# Patient Record
Sex: MALE | Race: WHITE | NOT HISPANIC OR LATINO | Employment: OTHER | URBAN - NONMETROPOLITAN AREA
[De-identification: names, ages, dates, MRNs, and addresses within clinical notes are randomized per-mention and may not be internally consistent; named-entity substitution may affect disease eponyms.]

---

## 2020-01-22 ENCOUNTER — OFFICE VISIT (OUTPATIENT)
Dept: ORTHOPEDIC SURGERY | Facility: CLINIC | Age: 70
End: 2020-01-22

## 2020-01-22 VITALS — TEMPERATURE: 98.3 F | WEIGHT: 166.4 LBS | HEIGHT: 65 IN | BODY MASS INDEX: 27.72 KG/M2

## 2020-01-22 DIAGNOSIS — M17.11 PRIMARY OSTEOARTHRITIS OF RIGHT KNEE: Primary | ICD-10-CM

## 2020-01-22 PROCEDURE — 73560 X-RAY EXAM OF KNEE 1 OR 2: CPT | Performed by: PHYSICIAN ASSISTANT

## 2020-01-22 PROCEDURE — 99203 OFFICE O/P NEW LOW 30 MIN: CPT | Performed by: PHYSICIAN ASSISTANT

## 2020-01-22 RX ORDER — ALLOPURINOL 300 MG/1
300 TABLET ORAL DAILY
COMMUNITY
Start: 2019-11-21

## 2020-01-22 RX ORDER — IBUPROFEN 200 MG
200 TABLET ORAL EVERY 6 HOURS PRN
COMMUNITY
End: 2021-08-05

## 2020-01-22 RX ORDER — LOSARTAN POTASSIUM 100 MG/1
TABLET ORAL
COMMUNITY
End: 2021-08-05

## 2020-02-02 PROBLEM — M17.11 PRIMARY OSTEOARTHRITIS OF RIGHT KNEE: Status: ACTIVE | Noted: 2020-02-02

## 2020-02-02 RX ORDER — DICLOFENAC SODIUM 75 MG/1
75 TABLET, DELAYED RELEASE ORAL 2 TIMES DAILY
COMMUNITY
Start: 2019-11-18 | End: 2021-08-05

## 2020-02-02 NOTE — PROGRESS NOTES
NEW VISIT    Patient: Russell Hernandez  ?  YOB: 1950    MRN: [id number]  ?  Chief Complaint   Patient presents with   • Right Knee - Pain, Establish Care      ?  HPI:   69 year old male patient presents with complaint of right knee pain for approximaely 3 months with progressive worsening. He denies any injury to the knee. He has seen Dr. Franco in the recent months and was given a prescription for advil.   Pain Location: right knee  Radiation: into medial face of tibia  Quality: stabbing, burning  Intensity/Severity: moderate  Duration: approximately 3 months   Onset quality: gradual   Timing: intermittent  Aggravating Factors: walking, standing  Alleviating Factors: rest  Previous Episodes: none mentioned  Associated Symptoms: pain, swelling  ADLs Affected: ambulating, recreational activities/sports    This patient is a new patient.  This problem is new to this examiner.      Allergies: No Known Allergies    Medications:   Home Medications:  Current Outpatient Medications on File Prior to Visit   Medication Sig   • allopurinol (ZYLOPRIM) 300 MG tablet Take 300 mg by mouth Daily.   • ibuprofen (ADVIL,MOTRIN) 200 MG tablet Take 200 mg by mouth Every 6 (Six) Hours As Needed for Mild Pain .   • losartan (COZAAR) 100 MG tablet losartan 100 mg tablet   • diclofenac (VOLTAREN) 75 MG EC tablet Take 75 mg by mouth 2 (Two) Times a Day.     No current facility-administered medications on file prior to visit.      Current Medications:  Scheduled Meds:  PRN Meds:.    I have reviewed the patient's medical history in detail and updated the computerized patient record.  Review and summarization of old records include:    Past Medical History:   Diagnosis Date   • Agitation    • Alcoholism /alcohol abuse (CMS/HCC)    • Depression    • Gout    • HTN (hypertension)    • Poor concentration    • Sensitivity to light    • Sleep apnea    • Sleep disorder      Past Surgical History:   Procedure Laterality Date   • JOINT  "REPLACEMENT Left     Knee   • KNEE SURGERY       Social History     Occupational History   • Not on file   Tobacco Use   • Smoking status: Never Smoker   Substance and Sexual Activity   • Alcohol use: Yes     Alcohol/week: 6.0 standard drinks     Types: 6 Standard drinks or equivalent per week   • Drug use: Defer   • Sexual activity: Defer      Social History     Social History Narrative   • Not on file     Family History   Problem Relation Age of Onset   • Diabetes Other    • Heart disease Other    • Hypertension Other          Review of Systems  Constitutional: Negative.  Negative for fever.   Eyes: Negative.    Respiratory: Negative.    Cardiovascular: Negative.    Endocrine: Negative.    Musculoskeletal: Positive for arthralgias, gait problem and joint swelling.   Skin: Negative.  Negative for rash and wound.   Allergic/Immunologic: Negative.    Neurological: Negative for numbness.   Hematological: Negative.    Psychiatric/Behavioral: Negative.         Wt Readings from Last 3 Encounters:   02/17/20 75.3 kg (166 lb)   01/22/20 75.5 kg (166 lb 6.4 oz)     Ht Readings from Last 3 Encounters:   02/17/20 165 cm (64.96\")   01/22/20 165.1 cm (65\")     Body mass index is 27.69 kg/m².  Facility age limit for growth percentiles is 20 years.  Vitals:    01/22/20 1440   Temp: 98.3 °F (36.8 °C)         Physical Exam  Constitutional: Patient is oriented to person, place, and time. Appears well-developed and well-nourished.   HENT:   Head: Normocephalic and atraumatic.   Eyes: Conjunctivae and EOM are normal. Pupils are equal, round, and reactive to light.   Cardiovascular: Normal rate and intact distal pulses.   Pulmonary/Chest: Effort normal and breath sounds normal.   Musculoskeletal:   See detailed exam below   Neurological: Alert and oriented to person, place, and time. No sensory deficit. Coordination normal.   Skin: Skin is warm and dry. Capillary refill takes less than 2 seconds. No rash noted. No erythema. "   Psychiatric: Patient has a normal mood and affect. His behavior is normal. Judgment and thought content normal.   Nursing note and vitals reviewed.      Ortho Exam:   Affected Knee: Right knee  Patient has crepitus throughout range of motion.   Mild effusion.   Moderate joint line tenderness is noted on the medial aspect of the knee.   Patient has a varus orientation of the knee.   There is mild fullness and tenderness in the popliteal fossa.   Range of motion: Flexion 0- 110 degrees.   Neurovascular status is intact.  Dorsalis pedis and posterior tibial artery pulses are palpable. Common peroneal nerve function is well preserved.   Patient's gait is cautious and antalgic.   Special Testing:  Lachman negative,   Anterior drawer negative,   Posterior drawer negative,   Lee's negative,   Patellofemoral grind positive.        Diagnostics:  Independently reviewed and interpreted plain film xrays of Right Knee, performed at [facility] on 11/18/2019, summary of my impression below:  AP, Lateral views  Findings: tricompartmental osteoarthritis with narrowing of the medial joint space, with a near bone on bone appearance, and narrowing of patellofemoral compartment. There are mild changes of the lateral joint space.  Bony lesion: no  Soft tissues: within normal limits  Joint spaces: decreased  Hardware appropriately positioned: not applicable  Prior studies available for comparison: no   This patient's x-ray report was graded according to the Kellgren and Jhonny classification.  This took into account the joint space narrowing, osteophyte formation, sclerosis of the distal femur/proximal tibia along with deformity of those bones.  The findings were indicative of K L grade 3-4.           Assessment:  Russell was seen today for pain and establish care.    Diagnoses and all orders for this visit:    Primary osteoarthritis of right knee        Plan    · Discussed xray findings and treatment options available to  him.  · Steroid and Monovisc injection discussed-patient would like to get the monovisc if his insurance will approve it.    · Use of neoprene sleeve brace, hinge knee brace discussed and recommended   · Medication options discussed and recommended  · Surgical options discussed right total knee  · Rest, ice, compression, and elevation (RICE) therapy  · Stretching and strengthening exercises  · Alternate OTC Ibuprofen and Tylenol as needed/if clinically indicated    Date of encounter: 01/22/2020   Reid Hicks PA-C    Electronically signed by Reid Hicks PA-C, 02/02/20, 11:47 AM.

## 2020-02-17 ENCOUNTER — CLINICAL SUPPORT (OUTPATIENT)
Dept: ORTHOPEDIC SURGERY | Facility: CLINIC | Age: 70
End: 2020-02-17

## 2020-02-17 VITALS — WEIGHT: 166 LBS | BODY MASS INDEX: 27.66 KG/M2 | TEMPERATURE: 98.2 F | HEIGHT: 65 IN

## 2020-02-17 DIAGNOSIS — M17.11 OSTEOARTHRITIS OF RIGHT KNEE, UNSPECIFIED OSTEOARTHRITIS TYPE: Primary | ICD-10-CM

## 2020-02-17 PROCEDURE — 20610 DRAIN/INJ JOINT/BURSA W/O US: CPT | Performed by: PHYSICIAN ASSISTANT

## 2020-02-17 RX ORDER — UBIDECARENONE 75 MG
CAPSULE ORAL
COMMUNITY

## 2020-02-17 RX ORDER — CHOLECALCIFEROL (VITAMIN D3) 10(400)/ML
DROPS ORAL
COMMUNITY
End: 2021-08-05

## 2020-02-17 RX ORDER — BENZONATATE 100 MG/1
CAPSULE ORAL
COMMUNITY
Start: 2019-11-18 | End: 2021-08-05

## 2020-02-17 RX ORDER — AMOXICILLIN AND CLAVULANATE POTASSIUM 875; 125 MG/1; MG/1
1 TABLET, FILM COATED ORAL EVERY 12 HOURS
COMMUNITY
Start: 2019-11-18 | End: 2021-08-05

## 2020-02-17 RX ORDER — LIDOCAINE HYDROCHLORIDE 10 MG/ML
2 INJECTION, SOLUTION EPIDURAL; INFILTRATION; INTRACAUDAL; PERINEURAL
Status: COMPLETED | OUTPATIENT
Start: 2020-02-17 | End: 2020-02-17

## 2020-02-17 RX ADMIN — LIDOCAINE HYDROCHLORIDE 2 ML: 10 INJECTION, SOLUTION EPIDURAL; INFILTRATION; INTRACAUDAL; PERINEURAL at 14:43

## 2020-02-17 NOTE — PROGRESS NOTES
Procedure   Large Joint Arthrocentesis: R knee  Date/Time: 2/17/2020 2:43 PM  Consent given by: patient  Site marked: site marked  Timeout: Immediately prior to procedure a time out was called to verify the correct patient, procedure, equipment, support staff and site/side marked as required   Supporting Documentation  Indications: pain and joint swelling   Procedure Details  Location: knee - R knee  Preparation: Patient was prepped and draped in the usual sterile fashion  Needle size: 25 G  Approach: anterolateral  Medications administered: 88 mg Hyaluronan 88 MG/4ML; 2 mL lidocaine PF 1% 1 %  Patient tolerance: patient tolerated the procedure well with no immediate complications      Electronically signed by Reid Hicks PA-C, 02/17/20, 2:57 PM.

## 2020-02-17 NOTE — PROGRESS NOTES
INJECTION      Patient: Russell Hernandez    MRN: [id number]    YOB: 1950    Chief Complaint   Patient presents with   • Right Knee - Follow-up, Pain         History of Present Illness:  Russell Hernandez is here today for injection therapy. He is receiving his first injection of Monovisc into the right knee. No prior injections have been given. Options have been discussed and he understands.       Physical Exam:   69 y.o. male awake, alert, oriented, in no acute distress and well developed, well nourished.  There is Moderate joint line tenderness at the medial aspect of the knee. The knee has a varus orientation.   Positive for crepitus throughout range of motion.   Positive for Mild effusion.  Findings are consistent with synovitis and effusion.    Positive patellar grind test.   Negative Lachman test.    Negative anterior and posterior drawer.  Range of motion in extension and flexion is: 0-110 degrees.  Neurovascular status is intact.  Dorsalis pedis and posterior tibial artery pulses are palpable. Common peroneal nerve function is well preserved.   Gait is cautious and antalgic.      Procedure:  See separate procedure note    Injection site was identified by physical examination and cleaned with Betadine and alcohol swabs. Prior to needle insertion, ethyl chloride spray was used for surface anesthesia. Sterile technique was used.       ASSESSMENT:  Russell was seen today for follow-up and pain.    Diagnoses and all orders for this visit:    Osteoarthritis of right knee, unspecified osteoarthritis type  -     Large Joint Arthrocentesis: R knee  -     lidocaine PF 1% (XYLOCAINE) injection 2 mL  -     Hyaluronan (MONOVISC) injection 88 mg  -     Visco Treatment; Future          PLAN:   • Right knee Monovisc injection was discussed with the patient. Discussed indication, risks, benefits, and alternatives. Verbal consent was given to proceed with the procedure.   • Injection was performed from anteromedial  approach.  Patient tolerated the procedure well and no complications were encountered.  • Discussion of orthopedic goals and activities and patient/guardian expressed understanding.  • Ice, heat, rest, compression and elevation of extremity as beneficial  • nsaids and/or tylenol as beneficial  • Instructed to refrain from heavy activity/rest the extremity for the next 24-48 hours  • Discussion regarding possibility of cortisol flare and what to expect if this occurs  • Watch for signs and symptoms of infection  • Call if adverse effect from injection therapy  • Follow up in 6 months      Reid Hicks PA-C  Encounter Date: 2/17/2020    Electronically signed by Reid Hicks PA-C, 02/17/20, 2:58 PM.

## 2021-07-06 ENCOUNTER — HOSPITAL ENCOUNTER (EMERGENCY)
Dept: HOSPITAL 49 - FER | Age: 71
Discharge: OTHER INSTITUTION | End: 2021-07-06
Payer: COMMERCIAL

## 2021-07-06 DIAGNOSIS — I21.4: Primary | ICD-10-CM

## 2021-07-06 DIAGNOSIS — Z20.822: ICD-10-CM

## 2021-07-06 DIAGNOSIS — J43.9: ICD-10-CM

## 2021-07-06 DIAGNOSIS — I10: ICD-10-CM

## 2021-07-06 LAB
ALBUMIN SERPL-MCNC: 3.7 G/DL (ref 3.4–5)
ALKALINE PHOSHATASE: 67 U/L (ref 46–116)
ALT SERPL-CCNC: 52 U/L (ref 16–63)
AST: 39 U/L (ref 15–37)
BASOPHIL: 0.9 % (ref 0–2)
BILIRUBIN - TOTAL: 0.4 MG/DL (ref 0.2–1)
BUN SERPL-MCNC: 23 MG/DL (ref 7–18)
BUN/CREAT RATIO (CALC): 27.4 RATIO
CHLORIDE: 106 MMOL/L (ref 98–107)
CO2 (BICARBONATE): 24 MMOL/L (ref 21–32)
CREATININE: 0.84 MG/DL (ref 0.67–1.17)
D DIMER PPP FEU-MCNC: 0.48 UG/MLFEU (ref 0–0.41)
EOSINOPHIL: 2.8 % (ref 0–7)
GLOBULIN (CALCULATION): 3.6 G/DL
GLUCOSE SERPL-MCNC: 109 MG/DL (ref 74–106)
HCT: 36.8 % (ref 42–52)
HGB BLD-MCNC: 12.6 G/DL (ref 13.2–18)
INR PPP: 0.97 (ref 0.9–1.2)
LIPASE: 130 U/L (ref 73–393)
LYMPHOCYTE: 30.3 % (ref 15–48)
MCH RBC QN AUTO: 33.4 PG (ref 25–31)
MCHC RBC AUTO-ENTMCNC: 34.2 G/DL (ref 32–36)
MCV: 97.6 FL (ref 78–100)
MONOCYTE: 9.9 % (ref 0–12)
MPV: 10.6 FL (ref 6–9.5)
NEUTROPHIL: 55.6 % (ref 41–80)
NRBC: 0
PLT: 175 K/UL (ref 150–400)
POTASSIUM: 4.3 MMOL/L (ref 3.5–5.1)
PROTHROMBIN TIME: 12.3 SECONDS (ref 11.8–13.4)
PTT: 26.3 SECONDS (ref 24.4–34.7)
RBC MORPHOLOGY: NORMAL
RBC: 3.77 M/UL (ref 4.7–6)
RDW: 12.6 % (ref 11.5–14)
TOTAL PROTEIN: 7.3 G/DL (ref 6.4–8.2)
WBC: 5.8 K/UL (ref 4–10.5)

## 2021-07-06 PROCEDURE — U0002 COVID-19 LAB TEST NON-CDC: HCPCS

## 2021-07-30 DIAGNOSIS — M17.11 PRIMARY OSTEOARTHRITIS OF RIGHT KNEE: Primary | ICD-10-CM

## 2021-08-04 ENCOUNTER — HOSPITAL ENCOUNTER (OUTPATIENT)
Dept: GENERAL RADIOLOGY | Facility: HOSPITAL | Age: 71
Discharge: HOME | End: 2021-08-04
Admitting: PHYSICIAN ASSISTANT

## 2021-08-04 ENCOUNTER — CLINICAL SUPPORT (OUTPATIENT)
Dept: ORTHOPEDIC SURGERY | Facility: CLINIC | Age: 71
End: 2021-08-04

## 2021-08-04 VITALS — HEIGHT: 66 IN | WEIGHT: 167 LBS | BODY MASS INDEX: 26.84 KG/M2 | TEMPERATURE: 96.8 F

## 2021-08-04 DIAGNOSIS — M17.11 PRIMARY OSTEOARTHRITIS OF RIGHT KNEE: ICD-10-CM

## 2021-08-04 DIAGNOSIS — M17.11 PRIMARY OSTEOARTHRITIS OF RIGHT KNEE: Primary | ICD-10-CM

## 2021-08-04 PROCEDURE — 99214 OFFICE O/P EST MOD 30 MIN: CPT | Performed by: PHYSICIAN ASSISTANT

## 2021-08-04 PROCEDURE — 73562 X-RAY EXAM OF KNEE 3: CPT

## 2021-08-04 PROCEDURE — 20610 DRAIN/INJ JOINT/BURSA W/O US: CPT | Performed by: PHYSICIAN ASSISTANT

## 2021-08-04 PROCEDURE — 73562 X-RAY EXAM OF KNEE 3: CPT | Performed by: RADIOLOGY

## 2021-08-04 RX ADMIN — METHYLPREDNISOLONE ACETATE 160 MG: 80 INJECTION, SUSPENSION INTRA-ARTICULAR; INTRALESIONAL; INTRAMUSCULAR; SOFT TISSUE at 09:26

## 2021-08-04 RX ADMIN — LIDOCAINE HYDROCHLORIDE 2 ML: 10 INJECTION, SOLUTION EPIDURAL; INFILTRATION; INTRACAUDAL; PERINEURAL at 09:26

## 2021-08-04 NOTE — PROGRESS NOTES
"Chief Complaint  Follow-up and Pain of the Right Knee    Subjective    History of Present Illness      Russell Hernandez is a 71 y.o. male who presents to [facility] for follow up on right knee pain. I last saw him for this knee in February of 2020 and administered a Monovisc injection. He states he has done really well until the last 2 months. He reports an increase in pain at the medial aspect of the knee. He is unable to Monovisc today due to not having approval through insurance.         Objective   Vital Signs:   Temp 96.8 °F (36 °C)   Ht 167.6 cm (66\")   Wt 75.8 kg (167 lb)   BMI 26.95 kg/m²     Physical Exam  Vitals signs and nursing note reviewed.   Constitutional:       Appearance: Normal appearance.   Pulmonary:      Effort: Pulmonary effort is normal.   Skin:     General: Skin is warm and dry.      Capillary Refill: Capillary refill takes less than 2 seconds.   Neurological:      General: No focal deficit present.      Mental Status: He is alert and oriented to person, place, and time. Mental status is at baseline.   Psychiatric:         Mood and Affect: Mood normal.         Behavior: Behavior normal.         Thought Content: Thought content normal.         Judgment: Judgment normal.     Ortho Exam   RIGHT knee  There is mild joint line tenderness at the medial aspect of the knee.   Positive for varus orientation of the knee.   Positive for crepitus throughout range of motion.   Negative for effusion.  Positive patellar grind test.   Negative Lachman test.    Negative anterior and posterior drawer.  Range of motion in extension and flexion is: 0-110 degrees.  Neurovascular status is intact.    Dorsalis pedis and posterior tibial artery pulses are palpable.    Common peroneal nerve function is well preserved.  Gait is cautious and antalgic.       Result Review :   Radiologic studies - see below for interpretation  Right knee xrays 3 views were ordered by Reid Hicks, " SARAH Performed at [facility] on 8/4/21. Images were independently viewed and interpreted by myself, my impression as follows:  Findings: bone on bone articulation at medial compartment, moderate patellofemoral narrowing, lateral compartment appears well preserved.  Bony lesion: no  Soft tissues: within normal limits  Joint spaces: decreased  Hardware appropriately positioned: not applicable  Prior studies available for comparison: yes         PROCEDURE  Large Joint Arthrocentesis: R knee  Date/Time: 8/4/2021 9:26 AM  Consent given by: patient  Site marked: site marked  Timeout: Immediately prior to procedure a time out was called to verify the correct patient, procedure, equipment, support staff and site/side marked as required   Supporting Documentation  Indications: pain   Procedure Details  Location: knee - R knee  Preparation: Patient was prepped and draped in the usual sterile fashion  Needle size: 25 G  Approach: anteromedial  Medications administered: 2 mL lidocaine PF 1% 1 %; 160 mg methylPREDNISolone acetate 80 MG/ML  Patient tolerance: patient tolerated the procedure well with no immediate complications                 Assessment   Assessment and Plan    Problem List Items Addressed This Visit        Musculoskeletal and Injuries    Primary osteoarthritis of right knee - Primary    Relevant Orders    Large Joint Arthrocentesis: R knee    Visco Treatment          Follow Up   · Discussion of any imaging in detail. Discussion of orthopaedic goals.  · Risk, benefits, and merits of treatment alternatives reviewed with the patient. Treatment alternatives include: intra-articular steroid injection, bursal steroid injection and surgery. He would like to proceed with a steroid injection today. He is not interested in surgery at this time.   · Ice, heat, and/or modalities as beneficial  · Risks of minor surgical procedure/intra-articular injection, including but not limited to pain, bleeding,  infection into the joint, pigment skin changes related to steroid administration, increased blood sugar levels secondary to steroid administration, scar, recurrence of pain, and tendon rupture associated with steroid administration and possible need for further surgery reviewed with patient. He accepts these risks and wishes to proceed with procedure.  · Watch for signs and symptoms of infection  · Call or notify for any adverse effect from injection therapy  · Patient is encouraged to call or return for any issues or concerns.  · Follow up in 3 months for steroid or visco. Will request visco approval.  • Patient was given instructions and counseling regarding his condition or for health maintenance advice. Please see specific information pulled into the AVS if appropriate.     Reid Hicks PA-C   Date of Encounter: 8/4/2021   Electronically signed by Reid Hicks PA-C, 08/04/21, 6:35 AM EDT.     EMR Dragon/Transcription disclaimer:  Much of this encounter note is an electronic transcription/translation of spoken language to printed text. The electronic translation of spoken language may permit erroneous, or at times, nonsensical words or phrases to be inadvertently transcribed; Although I have reviewed the note for such errors, some may still exist.

## 2021-08-05 ENCOUNTER — OFFICE VISIT (OUTPATIENT)
Dept: FAMILY MEDICINE CLINIC | Age: 71
End: 2021-08-05

## 2021-08-05 VITALS
WEIGHT: 169.2 LBS | BODY MASS INDEX: 27.19 KG/M2 | HEIGHT: 66 IN | SYSTOLIC BLOOD PRESSURE: 143 MMHG | HEART RATE: 102 BPM | DIASTOLIC BLOOD PRESSURE: 92 MMHG

## 2021-08-05 DIAGNOSIS — R07.9 CHEST PAIN, UNSPECIFIED TYPE: Primary | ICD-10-CM

## 2021-08-05 DIAGNOSIS — I10 ESSENTIAL HYPERTENSION: ICD-10-CM

## 2021-08-05 PROCEDURE — 99203 OFFICE O/P NEW LOW 30 MIN: CPT | Performed by: FAMILY MEDICINE

## 2021-08-05 RX ORDER — LOSARTAN POTASSIUM 50 MG/1
50 TABLET ORAL DAILY
COMMUNITY
Start: 2021-06-28 | End: 2023-03-20 | Stop reason: ALTCHOICE

## 2021-08-05 RX ORDER — MELATONIN
1000 DAILY
COMMUNITY

## 2021-08-05 NOTE — PROGRESS NOTES
[facility]  Office Visit Note    Russell Hernandez presents to [facility]  Encounter Date: 08/05/2021     Chief Complaint  Establish Care (New patient to Dr. Santana, last PCP was Brie San)    Subjective    History of Present Illness:  Russell presents to clinic today to establish care.  Of note, he says that approximately 2 weeks ago he presented to the emergency department after experiencing a sensation of chest discomfort at home.  He said that the local hospital told him that he was having a heart attack and transferred him to [place].  He spent approximately 4 days in [place] or someone told him that he did not have a heart attack.  However, he was discharged to home with new medications such as aspirin, Brilinta, metoprolol and atorvastatin.  He says that someone called him and told him to hold off on taking these until he sees a cardiologist for follow-up as scheduled at discharge.  Today, he says that he has not had any additional chest pain.  He is very confused about what was wrong with him and what diagnosis he was given.  Pertaining to hypertension, he says his blood pressure has been very well controlled at home.  He checks this every day with average readings in the 120s to 130s over 70s to 80s.  His current regimen includes losartan 50 mg daily.    Review of Systems:  Review of Systems   Constitutional: Negative for chills, fatigue and fever.   Eyes: Negative for visual disturbance.   Respiratory: Negative for cough and shortness of breath.    Cardiovascular: Negative for chest pain, palpitations and leg swelling.   Gastrointestinal: Negative for abdominal pain, constipation, diarrhea, nausea and vomiting.   Neurological: Negative for dizziness, weakness, numbness and headache.   Psychiatric/Behavioral: Negative for sleep disturbance, suicidal ideas and depressed mood. The patient is not nervous/anxious.         Objective   Vital Signs:  BP  "143/92 (BP Location: Right arm, Patient Position: Sitting, Cuff Size: Adult)   Pulse 102   Ht 167.6 cm (66\")   Wt 76.7 kg (169 lb 3.2 oz)   BMI 27.31 kg/m²      Physical Examination:  Physical Exam  Vitals reviewed.   Constitutional:       General: He is not in acute distress.     Appearance: Normal appearance.   HENT:      Head: Normocephalic and atraumatic.   Eyes:      Conjunctiva/sclera: Conjunctivae normal.   Cardiovascular:      Rate and Rhythm: Normal rate and regular rhythm.      Heart sounds: No murmur heard.     Pulmonary:      Effort: Pulmonary effort is normal. No respiratory distress.      Breath sounds: Normal breath sounds.   Musculoskeletal:      Right lower leg: No edema.      Left lower leg: No edema.   Skin:     General: Skin is warm and dry.      Findings: No rash.   Neurological:      General: No focal deficit present.      Mental Status: He is alert and oriented to person, place, and time.   Psychiatric:         Mood and Affect: Mood normal.         Behavior: Behavior normal.         Procedures:    No procedures associated with this visit.     Assessment and Plan:  Diagnoses and all orders for this visit:    1. Chest pain, unspecified type (Primary)  -Unclear etiology.  Despite his assertions that someone told him he did not have a heart attack, the medications he was discharged on are those that would be given to someone who recently had a cardiac event.  We will request records from [facility], the hospital in [place] as well as the cardiologist that he supposed to see as an outpatient to try to piece together the story.  We will have her return in 1 week to go over what we found and restart any appropriate therapy that he needs.  ED/return precautions given.    2. Essential hypertension  Assessment & Plan:  Stable.  Continue losartan 50 mg daily.        Return in about 1 week (around 8/12/2021).    Patient was given instructions and counseling regarding his condition or " for health maintenance advice. Please see specific information pulled into the AVS if appropriate.      Joseph Santana MD   8/5/2021

## 2021-08-10 PROBLEM — M17.11 PRIMARY OSTEOARTHRITIS OF RIGHT KNEE: Status: ACTIVE | Noted: 2021-08-10

## 2021-08-10 RX ORDER — LIDOCAINE HYDROCHLORIDE 10 MG/ML
2 INJECTION, SOLUTION EPIDURAL; INFILTRATION; INTRACAUDAL; PERINEURAL
Status: COMPLETED | OUTPATIENT
Start: 2021-08-04 | End: 2021-08-04

## 2021-08-10 RX ORDER — METHYLPREDNISOLONE ACETATE 80 MG/ML
160 INJECTION, SUSPENSION INTRA-ARTICULAR; INTRALESIONAL; INTRAMUSCULAR; SOFT TISSUE
Status: COMPLETED | OUTPATIENT
Start: 2021-08-04 | End: 2021-08-04

## 2021-08-11 ENCOUNTER — OFFICE VISIT (OUTPATIENT)
Dept: FAMILY MEDICINE CLINIC | Age: 71
End: 2021-08-11

## 2021-08-11 VITALS
SYSTOLIC BLOOD PRESSURE: 138 MMHG | DIASTOLIC BLOOD PRESSURE: 78 MMHG | HEIGHT: 66 IN | OXYGEN SATURATION: 94 % | WEIGHT: 169 LBS | HEART RATE: 88 BPM | BODY MASS INDEX: 27.16 KG/M2

## 2021-08-11 DIAGNOSIS — K21.9 GASTROESOPHAGEAL REFLUX DISEASE WITHOUT ESOPHAGITIS: ICD-10-CM

## 2021-08-11 DIAGNOSIS — R07.9 CHEST PAIN, UNSPECIFIED TYPE: ICD-10-CM

## 2021-08-11 DIAGNOSIS — I10 ESSENTIAL HYPERTENSION: Primary | ICD-10-CM

## 2021-08-11 PROCEDURE — 99214 OFFICE O/P EST MOD 30 MIN: CPT | Performed by: FAMILY MEDICINE

## 2021-08-11 RX ORDER — PANTOPRAZOLE SODIUM 40 MG/1
1 TABLET, DELAYED RELEASE ORAL DAILY
COMMUNITY
Start: 2021-08-02 | End: 2021-08-11 | Stop reason: SDUPTHER

## 2021-08-11 RX ORDER — PANTOPRAZOLE SODIUM 40 MG/1
40 TABLET, DELAYED RELEASE ORAL DAILY
Qty: 90 TABLET | Refills: 1 | Status: SHIPPED | OUTPATIENT
Start: 2021-08-11 | End: 2021-09-28

## 2021-08-11 NOTE — PROGRESS NOTES
[facility]  Office Visit Note    Russell Hernandez presents to [facility]  Encounter Date: 08/11/2021     Chief Complaint  Follow-up (1 week follow up chest pain/ discomfort. Started taking Pantoprazole 40mg that was prescribed and states minimal discomfort)    Subjective    History of Present Illness:  Russell presents clinic today for follow-up.  He was last seen in our clinic approximately 1 week ago to establish care and as a hospital discharge follow-up.  He originally went to [facility] with chest discomfort and was told he was having a heart attack.  We have retained records from this visit which showed a troponin that was very elevated as well as possible T wave inversions/lateral ischemia on EKG.  He was transferred to [facility] in [place] where he said someone told him he did not have a heart attack.  However, he was discharged on aspirin, Brilinta, metoprolol and atorvastatin.  He was supposed to have a follow-up with a cardiologist later today but they called to reschedule and this is not yet been done.  He says that he has had no chest pain since discharge.  He says he occasionally will have some tightness/burning across his chest that responds to Tums and Protonix.  In fact, he is requesting a refill of Protonix today.  In terms of blood pressure, he says his blood pressure has been well controlled on his current regimen of losartan 50 mg daily.    Review of Systems:  Review of Systems   Constitutional: Negative for chills, fatigue and fever.   Eyes: Negative for visual disturbance.   Respiratory: Negative for cough and shortness of breath.    Cardiovascular: Negative for chest pain, palpitations and leg swelling.   Gastrointestinal: Negative for abdominal pain, constipation, diarrhea, nausea and vomiting.   Neurological: Negative for dizziness, weakness, numbness and headache.   Psychiatric/Behavioral: Negative for sleep disturbance,  "suicidal ideas and depressed mood. The patient is not nervous/anxious.         Objective   Vital Signs:  /78 (BP Location: Left arm, Patient Position: Sitting, Cuff Size: Adult)   Pulse 88   Ht 167.6 cm (66\")   Wt 76.7 kg (169 lb)   SpO2 94% Comment: room air  BMI 27.28 kg/m²      Physical Examination:  Physical Exam  Vitals reviewed.   Constitutional:       General: He is not in acute distress.     Appearance: Normal appearance.   HENT:      Head: Normocephalic and atraumatic.   Eyes:      Conjunctiva/sclera: Conjunctivae normal.   Cardiovascular:      Rate and Rhythm: Normal rate and regular rhythm.      Heart sounds: No murmur heard.     Pulmonary:      Effort: Pulmonary effort is normal. No respiratory distress.      Breath sounds: Normal breath sounds.   Musculoskeletal:      Right lower leg: No edema.      Left lower leg: No edema.   Skin:     General: Skin is warm and dry.      Findings: No rash.   Neurological:      General: No focal deficit present.      Mental Status: He is alert and oriented to person, place, and time.   Psychiatric:         Mood and Affect: Mood normal.         Behavior: Behavior normal.         Assessment and Plan:  Diagnoses and all orders for this visit:    1. Essential hypertension (Primary)  Assessment & Plan:  Stable.  Continue losartan 50 mg daily.      2. Gastroesophageal reflux disease without esophagitis  Assessment & Plan:  Stable.  Continue protonix 40 mg daily.      3. Chest pain, unspecified type  -At this point, it seems as though he may have had an NSTEMI.  However, I would like to see the records from [facility] in [place] to see what further work-up and what follow-up he is expected to have.  I have encouraged him to reschedule his cardiology appointment.  I hoped we would have had records before today's appointment.  However, I will call him when I receive them and we will go over a summary of what happened and what I think the next step should " be.    Other orders  -     pantoprazole (PROTONIX) 40 MG EC tablet; Take 1 tablet by mouth Daily.  Dispense: 90 tablet; Refill: 1      Return in about 2 weeks (around 8/25/2021).    Patient was given instructions and counseling regarding his condition or for health maintenance advice. Please see specific information pulled into the AVS if appropriate.      Joseph Santana MD   8/11/2021

## 2021-08-17 ENCOUNTER — TELEPHONE (OUTPATIENT)
Dept: FAMILY MEDICINE CLINIC | Age: 71
End: 2021-08-17

## 2021-08-17 NOTE — TELEPHONE ENCOUNTER
Caller: Russell Hernandez    Relationship: Self    Best call back number: [phone]    What is the best time to reach you: ANY    Who are you requesting to speak with (clinical staff, provider,  specific staff member): CLINICAL STAFF    What was the call regarding: PATIENT CALLED WANTING TO KNOW WHAT HE IS SUPPOSED TO DO. HE WAS TOLD THAT PAPERWORK FROM [facility] WOULD BE FAXED TO THE OFFICE AND HE WANTED A CALL BACK AND SPEAK WITH A NURSE    Do you require a callback: YES

## 2021-08-18 ENCOUNTER — TELEPHONE (OUTPATIENT)
Dept: FAMILY MEDICINE CLINIC | Age: 71
End: 2021-08-18

## 2021-08-18 NOTE — TELEPHONE ENCOUNTER
Caller: Russell Hernandez    Relationship to patient: Self    Best call back number: [phone]     Patient is needing: PT WANTS TO KNOW IF DR ATKINSON IS IN THE [employer] NETWORK, PLEASE CALL BACK AND ADVISE, THANKYOU.    UNABLE TO WARM TRANSFER.

## 2021-08-23 NOTE — TELEPHONE ENCOUNTER
"Spoke with pt and inf, pt stated that the cardiology office had a problem with him and they were not happy with him and that f/u appt would probably not happen, pt stated \"Look, I cancelled that appt because I was waiting for him to review my records and waiting for someone to call me to let me know about this situation and no one has called me, I understand he had a personal problem and his son was in the hospital\" inf pt that daughter was in the hospital. Pt stated \"What difference does that make? I'm 72 yrs old, i've had a good life, he needed to be with his kid and I understood that, so what am I suppose to do now?\" inf pt he should f/u with cardiology specialist since that is who tx him, pt stated \"So am I suppose to forget about Dr. Santana or what?\" inf pt he should follow up with Dr. Santana as well, pt stated \"NO YOU CAN make me a follow up appt\" inf pt he could make appt at the , pt laughed and stated ok, then hung up abruptly.  "

## 2021-09-10 ENCOUNTER — TELEPHONE (OUTPATIENT)
Dept: FAMILY MEDICINE CLINIC | Age: 71
End: 2021-09-10

## 2021-09-10 NOTE — TELEPHONE ENCOUNTER
Caller: Russell Hernandez    Relationship: Self    Best call back number: [phone]    What is the best time to reach you: ANY    Who are you requesting to speak with (clinical staff, provider,  specific staff member): CLINCIAL    What was the call regarding: REQUESTING TO SCHEDULE FLU AND OTHER VACCINES THAT HE MAY NEED    Do you require a callback: YES, PLEASE CALL AND ADVISE

## 2021-09-28 ENCOUNTER — OFFICE VISIT (OUTPATIENT)
Dept: FAMILY MEDICINE CLINIC | Age: 71
End: 2021-09-28

## 2021-09-28 VITALS
HEIGHT: 66 IN | WEIGHT: 166 LBS | SYSTOLIC BLOOD PRESSURE: 121 MMHG | BODY MASS INDEX: 26.68 KG/M2 | DIASTOLIC BLOOD PRESSURE: 72 MMHG | TEMPERATURE: 99.5 F

## 2021-09-28 DIAGNOSIS — E78.00 HIGH CHOLESTEROL: ICD-10-CM

## 2021-09-28 DIAGNOSIS — Z12.5 SCREENING FOR PROSTATE CANCER: ICD-10-CM

## 2021-09-28 DIAGNOSIS — I10 ESSENTIAL HYPERTENSION: ICD-10-CM

## 2021-09-28 DIAGNOSIS — M1A.09X0 IDIOPATHIC CHRONIC GOUT OF MULTIPLE SITES WITHOUT TOPHUS: ICD-10-CM

## 2021-09-28 DIAGNOSIS — K21.9 GASTROESOPHAGEAL REFLUX DISEASE WITHOUT ESOPHAGITIS: ICD-10-CM

## 2021-09-28 DIAGNOSIS — I25.10 CORONARY ARTERY DISEASE INVOLVING NATIVE CORONARY ARTERY OF NATIVE HEART WITHOUT ANGINA PECTORIS: ICD-10-CM

## 2021-09-28 DIAGNOSIS — E53.8 VITAMIN B 12 DEFICIENCY: ICD-10-CM

## 2021-09-28 DIAGNOSIS — E55.9 VITAMIN D DEFICIENCY: Primary | ICD-10-CM

## 2021-09-28 PROBLEM — M17.11 PRIMARY OSTEOARTHRITIS OF RIGHT KNEE: Status: RESOLVED | Noted: 2021-08-10 | Resolved: 2021-09-28

## 2021-09-28 PROBLEM — M17.11 OSTEOARTHRITIS OF RIGHT KNEE: Status: RESOLVED | Noted: 2020-02-02 | Resolved: 2021-09-28

## 2021-09-28 PROCEDURE — 99214 OFFICE O/P EST MOD 30 MIN: CPT | Performed by: FAMILY MEDICINE

## 2021-09-28 RX ORDER — METOPROLOL SUCCINATE 25 MG/1
25 TABLET, EXTENDED RELEASE ORAL DAILY
COMMUNITY
Start: 2021-09-24 | End: 2023-03-20 | Stop reason: DRUGHIGH

## 2021-09-28 RX ORDER — SUCRALFATE 1 G/1
1 TABLET ORAL 4 TIMES DAILY
COMMUNITY
Start: 2021-08-24 | End: 2023-03-20 | Stop reason: ALTCHOICE

## 2021-09-28 RX ORDER — TICAGRELOR 90 MG/1
90 TABLET ORAL 2 TIMES DAILY
COMMUNITY
Start: 2021-08-30 | End: 2023-03-20 | Stop reason: ALTCHOICE

## 2021-09-28 RX ORDER — ASPIRIN 81 MG/1
81 TABLET, CHEWABLE ORAL DAILY
COMMUNITY

## 2021-09-28 NOTE — ASSESSMENT & PLAN NOTE
Coronary artery disease is improving with treatment.  Continue current treatment regimen.  Cardiac status will be reassessed in 6 months   CURRENTLY ASYMPTOMATIC, WILL SEE CARDIOLOGY AGAIN NEXT WEEK

## 2021-09-28 NOTE — PROGRESS NOTES
Chief Complaint  Establish Care (from Dr Santana)    Subjective          Russell Hernandez presents to [facility]  --HISTORY OF CAD, RECENT CATH WITH STENT PLACEMENT AFTER A NONTEMI, FEELING WELL, WILL SEE CARDIOLOGY AGAIN NEXT WEEK  --TOLERATING BP MEDS WITHOUT APPARENT SIDE EFFECTS  --CHOL HAS BEEN HIGH BUT HE CANNOT TOLERATE MEDS  --HE WAS STARTED ON A PPI AND CARAFATE WHEN HE FIRST ADMITTED FOR CHEST PAIN.  HE HAS STOPPED THE PPI BUT CONTINUES ON THE CARAFATE.  DENIES ANY GERD SYMPTOMS.              No Known Allergies     Health Maintenance Due   Topic Date Due   • COLORECTAL CANCER SCREENING  Never done   • TDAP/TD VACCINES (1 - Tdap) Never done   • ZOSTER VACCINE (1 of 2) Never done   • HEPATITIS C SCREENING  Never done   • ANNUAL WELLNESS VISIT  Never done   • LIPID PANEL  Never done        Current Outpatient Medications on File Prior to Visit   Medication Sig   • allopurinol (ZYLOPRIM) 300 MG tablet Take 300 mg by mouth Daily.   • aspirin 81 MG chewable tablet Chew 81 mg Daily.   • Brilinta 90 MG tablet tablet Take 90 mg by mouth 2 (Two) Times a Day.   • cholecalciferol (VITAMIN D3) 25 MCG (1000 UT) tablet Take 1,000 Units by mouth Daily.   • metoprolol succinate XL (TOPROL-XL) 25 MG 24 hr tablet Take 25 mg by mouth Daily.   • sucralfate (CARAFATE) 1 g tablet Take 1 g by mouth 4 (Four) Times a Day.   • vitamin B-12 (CYANOCOBALAMIN) 100 MCG tablet Vitamin B12   • losartan (COZAAR) 50 MG tablet Take 50 mg by mouth Daily.   • [DISCONTINUED] pantoprazole (PROTONIX) 40 MG EC tablet Take 1 tablet by mouth Daily.     No current facility-administered medications on file prior to visit.       Immunization History   Administered Date(s) Administered   • COVID-19 (PFIZER) 03/22/2021, 04/12/2021   • Pneumococcal Polysaccharide (PPSV23) 07/08/2020       Review of Systems   Constitutional: Negative for activity change, appetite change, chills, fatigue and fever.   HENT: Negative for congestion,  "rhinorrhea and sore throat.    Respiratory: Negative for cough and shortness of breath.    Cardiovascular: Negative for chest pain, palpitations and leg swelling.   Gastrointestinal: Negative for abdominal pain, constipation, diarrhea, nausea and vomiting.   Genitourinary: Negative for difficulty urinating.   Musculoskeletal: Negative for arthralgias and myalgias.   Neurological: Negative for headache.        Objective     /72 (BP Location: Left arm, Patient Position: Sitting)   Temp 99.5 °F (37.5 °C) (Oral)   Ht 167.6 cm (66\")   Wt 75.3 kg (166 lb)   BMI 26.79 kg/m²       Physical Exam  Vitals and nursing note reviewed.   Constitutional:       General: He is not in acute distress.     Appearance: Normal appearance.   Cardiovascular:      Rate and Rhythm: Normal rate and regular rhythm.      Heart sounds: Normal heart sounds. No murmur heard.     Pulmonary:      Effort: Pulmonary effort is normal.      Breath sounds: Normal breath sounds.   Abdominal:      Palpations: Abdomen is soft.      Tenderness: There is no abdominal tenderness.   Musculoskeletal:      Cervical back: Neck supple.      Right lower leg: No edema.      Left lower leg: No edema.   Lymphadenopathy:      Cervical: No cervical adenopathy.   Neurological:      General: No focal deficit present.      Mental Status: He is alert.      Cranial Nerves: No cranial nerve deficit.      Coordination: Coordination normal.      Gait: Gait normal.   Psychiatric:         Mood and Affect: Mood normal.         Behavior: Behavior normal.         Result Review :                             Assessment and Plan      Diagnoses and all orders for this visit:    1. Vitamin D deficiency (Primary)  -     Vitamin D 25 Hydroxy; Future    2. Vitamin B 12 deficiency  -     Vitamin B12 & Folate; Future    3. Idiopathic chronic gout of multiple sites without tophus  Assessment & Plan:  IMPROVED ON CURRENT MEDS, CONTINUE SAME, WILL REEVALUATE AT NEXT VISIT IN SIX MONTHS. "     Orders:  -     Uric Acid; Future    4. High cholesterol (cannot tolerate meds)   Assessment & Plan:  Lipid abnormalities are improving with lifestyle modifications.  Nutritional counseling was provided.  Lipids will be reassessed in 3 months.    Orders:  -     Lipid Panel; Future    5. Gastroesophageal reflux disease without esophagitis  Assessment & Plan:  UNCLEAR IF HE ACTUALLY HAS GERD OR NOT.  FOR NOW HE WILL CONTINUE THE CARAFATE UNTIL HE CAN DISCUSS WITH HIS CARDIOLOGIST.        6. Essential hypertension  Assessment & Plan:  Hypertension is improving with treatment.  Continue current treatment regimen.  Dietary sodium restriction.  Regular aerobic exercise.  Continue current medications.  Blood pressure will be reassessed in 3 months.    Orders:  -     Comprehensive Metabolic Panel; Future  -     CBC (No Diff); Future  -     TSH; Future    7. Coronary artery disease (stents)   Assessment & Plan:  Coronary artery disease is improving with treatment.  Continue current treatment regimen.  Cardiac status will be reassessed in 6 months   CURRENTLY ASYMPTOMATIC, WILL SEE CARDIOLOGY AGAIN NEXT WEEK      8. Screening for prostate cancer  -     PSA Screen; Future          Follow Up     Return in about 6 months (around 3/28/2022).    Patient was given instructions and counseling regarding his condition or for health maintenance advice. Please see specific information pulled into the AVS if appropriate.

## 2021-09-28 NOTE — ASSESSMENT & PLAN NOTE
UNCLEAR IF HE ACTUALLY HAS GERD OR NOT.  FOR NOW HE WILL CONTINUE THE CARAFATE UNTIL HE CAN DISCUSS WITH HIS CARDIOLOGIST.

## 2021-10-06 ENCOUNTER — CLINICAL SUPPORT (OUTPATIENT)
Dept: FAMILY MEDICINE CLINIC | Age: 71
End: 2021-10-06

## 2021-10-06 DIAGNOSIS — Z23 NEED FOR INFLUENZA VACCINATION: Primary | ICD-10-CM

## 2021-10-06 PROCEDURE — G0008 ADMIN INFLUENZA VIRUS VAC: HCPCS | Performed by: FAMILY MEDICINE

## 2021-10-06 PROCEDURE — 90662 IIV NO PRSV INCREASED AG IM: CPT | Performed by: FAMILY MEDICINE

## 2022-03-16 ENCOUNTER — CLINICAL SUPPORT (OUTPATIENT)
Dept: ORTHOPEDIC SURGERY | Facility: CLINIC | Age: 72
End: 2022-03-16

## 2022-03-16 VITALS — TEMPERATURE: 98.6 F | WEIGHT: 163 LBS | BODY MASS INDEX: 26.2 KG/M2 | HEIGHT: 66 IN

## 2022-03-16 DIAGNOSIS — M17.11 PRIMARY OSTEOARTHRITIS OF RIGHT KNEE: Primary | ICD-10-CM

## 2022-03-16 PROCEDURE — 20610 DRAIN/INJ JOINT/BURSA W/O US: CPT | Performed by: PHYSICIAN ASSISTANT

## 2022-03-16 RX ORDER — LIDOCAINE HYDROCHLORIDE 20 MG/ML
2 INJECTION, SOLUTION EPIDURAL; INFILTRATION; INTRACAUDAL; PERINEURAL
Status: COMPLETED | OUTPATIENT
Start: 2022-03-16 | End: 2022-03-16

## 2022-03-16 RX ADMIN — LIDOCAINE HYDROCHLORIDE 2 ML: 20 INJECTION, SOLUTION EPIDURAL; INFILTRATION; INTRACAUDAL; PERINEURAL at 14:54

## 2022-03-16 NOTE — PROGRESS NOTES
"Chief Complaint  Follow-up and Pain of the Right Knee    Subjective    History of Present Illness      Russell Hernandez is a 71 y.o. male who presents to [facility] for follow up on right knee pain. He last received a steroid injection in the knee in August. He had been getting the Monovisc although his insurance will now only pay for Synvisc One. Today he will be getting the Synvisc.        Objective   Vital Signs:   Temp 98.6 °F (37 °C)   Ht 167.6 cm (66\")   Wt 73.9 kg (163 lb)   BMI 26.31 kg/m²     Physical Exam  Vitals signs and nursing note reviewed.   Constitutional:       Appearance: Normal appearance.   Pulmonary:      Effort: Pulmonary effort is normal.   Skin:     General: Skin is warm and dry.      Capillary Refill: Capillary refill takes less than 2 seconds.   Neurological:      General: No focal deficit present.      Mental Status: He is alert and oriented to person, place, and time. Mental status is at baseline.   Psychiatric:         Mood and Affect: Mood normal.         Behavior: Behavior normal.         Thought Content: Thought content normal.         Judgment: Judgment normal.     Ortho Exam   RIGHT knee  There is mild joint line tenderness at the medial aspect of the knee.   Positive for varus orientation of the knee.   Positive for crepitus throughout range of motion.   Negative for effusion.  Positive patellar grind test.   Negative Lachman test.    Negative anterior and posterior drawer.  Range of motion in extension and flexion is: 0-110 degrees.  Neurovascular status is intact.    Dorsalis pedis and posterior tibial artery pulses are palpable.    Common peroneal nerve function is well preserved.  Gait is cautious and antalgic.           PROCEDURE  Large Joint Arthrocentesis: R knee  Date/Time: 3/16/2022 2:54 PM  Consent given by: patient  Site marked: site marked  Timeout: Immediately prior to procedure a time out was called to verify the correct patient, " procedure, equipment, support staff and site/side marked as required   Supporting Documentation  Indications: pain   Procedure Details  Location: knee - R knee  Preparation: Patient was prepped and draped in the usual sterile fashion  Needle size: 25 G  Approach: anteromedial  Medications administered: 48 mg hylan 48 MG/6ML; 2 mL lidocaine PF 2% 2 %  Patient tolerance: patient tolerated the procedure well with no immediate complications                 Assessment   Assessment and Plan    Problem List Items Addressed This Visit        Musculoskeletal and Injuries    Primary osteoarthritis of right knee - Primary    Relevant Orders    Large Joint Arthrocentesis: R knee    Visco Treatment          Follow Up   · Discussion of any imaging in detail. Discussion of orthopaedic goals.  · Ice, heat, and/or modalities as beneficial  · Risks of minor surgical procedure/intra-articular injection, including but not limited to pain, bleeding, infection into the joint, pigment skin changes related to steroid administration, increased blood sugar levels secondary to steroid administration, scar, recurrence of pain, and tendon rupture associated with steroid administration and possible need for further surgery reviewed with patient. He accepts these risks and wishes to proceed with procedure. Injected patient's right knee joint(s) with Synvisc One from a(n) anteromedial approach.  Patient tolerated the procedure well and no complications were encountered.   · Watch for signs and symptoms of infection  · Call or notify for any adverse effect from injection therapy  · Patient is encouraged to call or return for any issues or concerns.  · Follow up in 6 months for synvisc one  • Patient was given instructions and counseling regarding his condition or for health maintenance advice. Please see specific information pulled into the AVS if appropriate.     Reid Hicks PA-C   Date of Encounter: 3/16/2022   Electronically signed by  Reid Hicks PA-C, 03/16/22, 3:31 PM EDT.     EMR Dragon/Transcription disclaimer:  Much of this encounter note is an electronic transcription/translation of spoken language to printed text. The electronic translation of spoken language may permit erroneous, or at times, nonsensical words or phrases to be inadvertently transcribed; Although I have reviewed the note for such errors, some may still exist.

## 2022-04-06 ENCOUNTER — CLINICAL SUPPORT (OUTPATIENT)
Dept: ORTHOPEDIC SURGERY | Facility: CLINIC | Age: 72
End: 2022-04-06

## 2022-04-06 VITALS — TEMPERATURE: 97.9 F | HEIGHT: 67 IN | WEIGHT: 164 LBS | BODY MASS INDEX: 25.74 KG/M2

## 2022-04-06 DIAGNOSIS — M17.11 PRIMARY OSTEOARTHRITIS OF RIGHT KNEE: Primary | ICD-10-CM

## 2022-04-06 PROCEDURE — 20610 DRAIN/INJ JOINT/BURSA W/O US: CPT | Performed by: PHYSICIAN ASSISTANT

## 2022-04-06 RX ORDER — LIDOCAINE HYDROCHLORIDE 10 MG/ML
2 INJECTION, SOLUTION INFILTRATION; PERINEURAL
Status: COMPLETED | OUTPATIENT
Start: 2022-04-06 | End: 2022-04-06

## 2022-04-06 RX ORDER — METHYLPREDNISOLONE ACETATE 80 MG/ML
160 INJECTION, SUSPENSION INTRA-ARTICULAR; INTRALESIONAL; INTRAMUSCULAR; SOFT TISSUE
Status: COMPLETED | OUTPATIENT
Start: 2022-04-06 | End: 2022-04-06

## 2022-04-06 RX ADMIN — METHYLPREDNISOLONE ACETATE 160 MG: 80 INJECTION, SUSPENSION INTRA-ARTICULAR; INTRALESIONAL; INTRAMUSCULAR; SOFT TISSUE at 11:30

## 2022-04-06 RX ADMIN — LIDOCAINE HYDROCHLORIDE 2 ML: 10 INJECTION, SOLUTION INFILTRATION; PERINEURAL at 11:30

## 2022-04-06 NOTE — PROGRESS NOTES
"Chief Complaint  Follow-up and Pain of the Right Knee    Subjective    History of Present Illness      Russell Hernandez is a 72 y.o. male who presents to [facility] for follow up on right knee pain. He last received a Synvisc 1 injection approximately 3 weeks ago.  He reports very little improvement in pain.  Today he returns to do a steroid injection at attempt at pain relief.      Objective   Vital Signs:   Temp 97.9 °F (36.6 °C)   Ht 170.2 cm (67\")   Wt 74.4 kg (164 lb)   BMI 25.69 kg/m²     Physical Exam  Vitals signs and nursing note reviewed.   Constitutional:       Appearance: Normal appearance.   Pulmonary:      Effort: Pulmonary effort is normal.   Skin:     General: Skin is warm and dry.      Capillary Refill: Capillary refill takes less than 2 seconds.   Neurological:      General: No focal deficit present.      Mental Status: He is alert and oriented to person, place, and time. Mental status is at baseline.   Psychiatric:         Mood and Affect: Mood normal.         Behavior: Behavior normal.         Thought Content: Thought content normal.         Judgment: Judgment normal.     Ortho Exam   RIGHT knee  There is mild joint line tenderness at the medial aspect of the knee.   Positive for varus orientation of the knee.   Positive for crepitus throughout range of motion.   Negative for effusion.  Positive patellar grind test.   Negative Lachman test.    Negative anterior and posterior drawer.  Range of motion in extension and flexion is: 0-110 degrees.  Neurovascular status is intact.    Dorsalis pedis and posterior tibial artery pulses are palpable.    Common peroneal nerve function is well preserved.  Gait is cautious and antalgic.           PROCEDURE  Procedures     See separate procedure note        Assessment   Assessment and Plan    Problem List Items Addressed This Visit        Musculoskeletal and Injuries    Primary osteoarthritis of right knee - Primary    Relevant " Orders    Large Joint Arthrocentesis: R knee          Follow Up   · Discussion of any imaging in detail. Discussion of orthopaedic goals.  · Ice, heat, and/or modalities as beneficial  · Risks of minor surgical procedure/intra-articular injection, including but not limited to pain, bleeding, infection into the joint, pigment skin changes related to steroid administration, increased blood sugar levels secondary to steroid administration, scar, recurrence of pain, and tendon rupture associated with steroid administration and possible need for further surgery reviewed with patient. He accepts these risks and wishes to proceed with procedure. Injected patient's right knee joint(s) with steroid from a(n) anteromedial approach.  Patient tolerated the procedure well and no complications were encountered.   · Watch for signs and symptoms of infection  · Call or notify for any adverse effect from injection therapy  · Patient is encouraged to call or return for any issues or concerns.  · Follow up in 3 months for steroid  • Patient was given instructions and counseling regarding his condition or for health maintenance advice. Please see specific information pulled into the AVS if appropriate.     Reid Hicks PA-C   Date of Encounter: 4/6/2022   Electronically signed by Reid Hicks PA-C, 04/06/22, 12:53 PM EDT.       EMR Dragon/Transcription disclaimer:  Much of this encounter note is an electronic transcription/translation of spoken language to printed text. The electronic translation of spoken language may permit erroneous, or at times, nonsensical words or phrases to be inadvertently transcribed; Although I have reviewed the note for such errors, some may still exist.

## 2022-04-06 NOTE — PROGRESS NOTES
Large Joint Arthrocentesis: R knee  Date/Time: 4/6/2022 11:30 AM  Consent given by: patient  Site marked: site marked  Timeout: Immediately prior to procedure a time out was called to verify the correct patient, procedure, equipment, support staff and site/side marked as required   Supporting Documentation  Indications: pain   Procedure Details  Location: knee - R knee  Preparation: Patient was prepped and draped in the usual sterile fashion  Needle size: 25 G  Approach: anteromedial  Medications administered: 2 mL lidocaine 1 %; 160 mg methylPREDNISolone acetate 80 MG/ML  Patient tolerance: patient tolerated the procedure well with no immediate complications      Electronically signed by Reid Hicks PA-C, 04/06/22, 12:51 PM EDT.

## 2022-05-12 ENCOUNTER — HOSPITAL ENCOUNTER (OUTPATIENT)
Dept: HOSPITAL 49 - FAS | Age: 72
Discharge: HOME | End: 2022-05-12
Attending: SURGERY
Payer: MEDICARE

## 2022-05-12 VITALS — WEIGHT: 164.99 LBS | BODY MASS INDEX: 26.52 KG/M2 | HEIGHT: 66 IN

## 2022-05-12 DIAGNOSIS — K63.5: Primary | ICD-10-CM

## 2022-05-12 DIAGNOSIS — K21.9: ICD-10-CM

## 2022-05-12 DIAGNOSIS — I25.10: ICD-10-CM

## 2022-05-12 DIAGNOSIS — M19.90: ICD-10-CM

## 2022-05-12 DIAGNOSIS — Z88.8: ICD-10-CM

## 2022-05-12 DIAGNOSIS — Z79.82: ICD-10-CM

## 2022-05-12 DIAGNOSIS — K62.1: ICD-10-CM

## 2022-05-12 DIAGNOSIS — Z79.02: ICD-10-CM

## 2022-05-12 DIAGNOSIS — M10.9: ICD-10-CM

## 2022-05-12 DIAGNOSIS — Z87.891: ICD-10-CM

## 2022-05-12 DIAGNOSIS — I10: ICD-10-CM

## 2022-05-12 DIAGNOSIS — I25.2: ICD-10-CM

## 2022-05-12 DIAGNOSIS — Z79.899: ICD-10-CM

## 2022-05-12 DIAGNOSIS — Z95.5: ICD-10-CM

## 2022-05-12 DIAGNOSIS — Z95.2: ICD-10-CM

## 2022-05-12 DIAGNOSIS — E78.00: ICD-10-CM

## 2022-07-06 ENCOUNTER — CLINICAL SUPPORT (OUTPATIENT)
Dept: ORTHOPEDIC SURGERY | Facility: CLINIC | Age: 72
End: 2022-07-06

## 2022-07-06 VITALS — HEIGHT: 66 IN | BODY MASS INDEX: 26.52 KG/M2 | WEIGHT: 165 LBS

## 2022-07-06 DIAGNOSIS — M17.11 PRIMARY OSTEOARTHRITIS OF RIGHT KNEE: Primary | ICD-10-CM

## 2022-07-06 PROCEDURE — 99213 OFFICE O/P EST LOW 20 MIN: CPT | Performed by: PHYSICIAN ASSISTANT

## 2022-07-06 PROCEDURE — 20610 DRAIN/INJ JOINT/BURSA W/O US: CPT | Performed by: PHYSICIAN ASSISTANT

## 2022-07-06 RX ORDER — METHYLPREDNISOLONE ACETATE 80 MG/ML
160 INJECTION, SUSPENSION INTRA-ARTICULAR; INTRALESIONAL; INTRAMUSCULAR; SOFT TISSUE
Status: COMPLETED | OUTPATIENT
Start: 2022-07-06 | End: 2022-07-06

## 2022-07-06 RX ADMIN — METHYLPREDNISOLONE ACETATE 160 MG: 80 INJECTION, SUSPENSION INTRA-ARTICULAR; INTRALESIONAL; INTRAMUSCULAR; SOFT TISSUE at 12:58

## 2022-07-06 NOTE — PROGRESS NOTES
"Chief Complaint  Follow-up of the Right Knee    Subjective    History of Present Illness      Russell Hernandez is a 72 y.o. male who presents to [facility] for follow up on right knee pain. He has had Synvisc One in the past without much improvement, therefore he was given a steroid injection shortly after that to help alleviate his pain. He reports mild-moderate relief with last injection. He does not feel like it is helping past one month. Today we will discuss treatment options.        Objective   Vital Signs:   Ht 167.6 cm (66\")   Wt 74.8 kg (165 lb)   BMI 26.63 kg/m²     Physical Exam  Vitals signs and nursing note reviewed.   Constitutional:       Appearance: Normal appearance.   Pulmonary:      Effort: Pulmonary effort is normal.   Skin:     General: Skin is warm and dry.      Capillary Refill: Capillary refill takes less than 2 seconds.   Neurological:      General: No focal deficit present.      Mental Status: He is alert and oriented to person, place, and time. Mental status is at baseline.   Psychiatric:         Mood and Affect: Mood normal.         Behavior: Behavior normal.         Thought Content: Thought content normal.         Judgment: Judgment normal.     Ortho Exam   RIGHT knee  There is moderate joint line tenderness at the medial aspect of the knee.   Positive for varus orientation of the knee.   Positive for crepitus throughout range of motion.   Negative for effusion.  Positive patellar grind test.   Negative Lachman test.    Negative anterior and posterior drawer.  Range of motion in extension and flexion is: 0-110 degrees.  Neurovascular status is intact.    Dorsalis pedis and posterior tibial artery pulses are palpable.    Common peroneal nerve function is well preserved.  Gait is cautious and antalgic.           PROCEDURE  Large Joint Arthrocentesis: R knee  Date/Time: 7/6/2022 12:58 PM  Consent given by: patient  Site marked: site marked  Timeout: Immediately " prior to procedure a time out was called to verify the correct patient, procedure, equipment, support staff and site/side marked as required   Supporting Documentation  Indications: pain   Procedure Details  Location: knee - R knee  Preparation: Patient was prepped and draped in the usual sterile fashion  Needle size: 25 G  Approach: anteromedial  Medications administered: 160 mg methylPREDNISolone acetate 80 MG/ML  Patient tolerance: patient tolerated the procedure well with no immediate complications                   Assessment   Assessment and Plan    Problem List Items Addressed This Visit        Musculoskeletal and Injuries    Primary osteoarthritis of right knee - Primary    Relevant Orders    Large Joint Arthrocentesis: R knee    COVID PRE-OP / PRE-PROCEDURE SCREENING ORDER (NO ISOLATION) - Swab, Nasopharynx    [facility]cedural Request    Follow Anesthesia Guidelines / Protocol    Obtain informed consent    Urinalysis With Culture If Indicated -    XR Knee 1 or 2 View Right        I spent 15 minutes caring for Russell on this date of service. This time includes time spent by me in the following activities:reviewing tests, performing a medically appropriate examination and/or evaluation , counseling and educating the patient/family/caregiver, ordering medications, tests, or procedures, documenting information in the medical record and care coordination.   Follow Up   · Discussion of any imaging in detail. Discussion of orthopaedic goals. Since he has failed conservative measures such as the gel/visco injection and is not getting lasting relief from the steroid injections, my recommendation would be to strongly consider proceeding with total knee arthroplasty.  Based on imaging from 8/4/2021, those x-rays showed bone-on-bone articulation of the medial compartment, moderate patellofemoral narrowing and lateral compartment with mild to moderate change.  I would recommend we update  those x-rays but based on prior x-rays alone he is most certainly candidate for TKA.  The patient was seen today for preoperative discussion.  The patient has been tried on over-the-counter and prescription NSAID's despite the risks of anti-inflammatory bleeding, peptic ulcers and erosive gastritis with short term benefit only.  Braces have been prescribed for mechanical support.  Patient has been participating in an exercise program specifically targeting joint pain relief with limited benefit. Intraarticular injections have been used periodically with some but not complete relief of pain.  Ambulation aids have also been utilized.    · The details of the surgical procedure were explained including the location of probable incisions and a description of the likely hardware/grafts to be used. The patient understands the likely convalescence after surgery as well as the rehabilitation required.  Also, we have thoroughly discussed with the patient the risks, benefits and alternatives to surgery.  Risks include but are not limited to the risk of infection, joint stiffness, limited range of motion, wound healing problems, scar tissue build up, myocardial infarction, stroke, blood clots (including DVT and/or pulmonary embolus along with the risk of death) neurologic and/or vascular injury, limb length discrepancy, fracture, dislocation, nonunion, malunion, continued pain and need for further surgery including hardware failure requiring revision.   · Ice, heat, and/or modalities as beneficial  · Risks of minor surgical procedure/intra-articular injection, including but not limited to pain, bleeding, infection into the joint, pigment skin changes related to steroid administration, increased blood sugar levels secondary to steroid administration, scar, recurrence of pain, and tendon rupture associated with steroid administration and possible need for further surgery reviewed with patient. He accepts these risks and wishes to  proceed with procedure. Injected patient's right knee joint(s) with steroid from a(n) anteromedial approach.  Patient tolerated the procedure well and no complications were encountered.   · Watch for signs and symptoms of infection  · Call or notify for any adverse effect from injection therapy  · Patient is encouraged to call or return for any issues or concerns.  · I will proceed with entering orders for TKA for the fall 2022.  He is seeing his cardiologist tomorrow and will request a cardiac clearance.  We also discussed the possibility that Dr. Archuleta may not be doing total joint replacements at [facility] within the next several months, in that case he does not wish to go to [place] and will look into going back to his original surgeon in [place] if needed.  • Patient was given instructions and counseling regarding his condition or for health maintenance advice. Please see specific information pulled into the AVS if appropriate.     Reid Hicks PA-C   Date of Encounter: 7/6/2022      Electronically signed by Reid Hicks PA-C, 07/06/22, 1:29 PM EDT.      EMR Dragon/Transcription disclaimer:  Much of this encounter note is an electronic transcription/translation of spoken language to printed text. The electronic translation of spoken language may permit erroneous, or at times, nonsensical words or phrases to be inadvertently transcribed; Although I have reviewed the note for such errors, some may still exist.

## 2022-09-26 ENCOUNTER — CLINICAL SUPPORT (OUTPATIENT)
Dept: ORTHOPEDIC SURGERY | Facility: CLINIC | Age: 72
End: 2022-09-26

## 2022-09-26 VITALS — HEIGHT: 66 IN | TEMPERATURE: 98.1 F | WEIGHT: 163 LBS | BODY MASS INDEX: 26.2 KG/M2

## 2022-09-26 DIAGNOSIS — M17.11 PRIMARY OSTEOARTHRITIS OF RIGHT KNEE: ICD-10-CM

## 2022-09-26 PROCEDURE — 20610 DRAIN/INJ JOINT/BURSA W/O US: CPT | Performed by: ORTHOPAEDIC SURGERY

## 2022-09-26 RX ORDER — METHYLPREDNISOLONE ACETATE 80 MG/ML
INJECTION, SUSPENSION INTRA-ARTICULAR; INTRALESIONAL; INTRAMUSCULAR; SOFT TISSUE
Status: COMPLETED | OUTPATIENT
Start: 2022-09-26 | End: 2022-09-26

## 2022-09-26 RX ADMIN — METHYLPREDNISOLONE ACETATE: 80 INJECTION, SUSPENSION INTRA-ARTICULAR; INTRALESIONAL; INTRAMUSCULAR; SOFT TISSUE at 10:08

## 2022-09-26 NOTE — PROGRESS NOTES
"Chief Complaint  Follow-up of the Right Knee    Subjective    History of Present Illness      Russell Hernandez is a 72 y.o. male who presents to [facility] for Patient returns today for right knee pain.  His pain is located over the medial aspect of the joint.  The pain has been progressive in nature and remains intermittent .  His pain is worsened by going up and down stairs. There has been improvement in the past with injections.       Objective   Vital Signs:   Temp 98.1 °F (36.7 °C)   Ht 167.6 cm (66\")   Wt 73.9 kg (163 lb)   BMI 26.31 kg/m²     Physical Exam  72 y.o. male is awake, alert, oriented, in no acute distress and well developed, well nourished.  Ortho Exam   RIGHT knee  Right knee (varus). Patient has crepitus throughout range of motion. Positive patellar grind test. Mild effusion. Lachman is negative. Pivot shift is negative. Anterior and posterior drawer signs are negative. Significant joint line tenderness is noted on the medial aspect of the knee. Patient has a varus orientation of the knee. There is fullness and tenderness in the Popliteal fossa. Mild distention of a Popliteal cyst is noted in this location. Range of motion in flexion is from 0-110 degrees. Neurovascular status is intact.  Dorsalis pedis and posterior tibial artery pulses are palpable. Common peroneal nerve function is well preserved. Patient's gait is cautious and antalgic. Skin and soft tissues are mildly swollen, consistent with synovitis and effusion. The patient has a significant limp with the first few steps after starting the gait cycle. Getting out of a chair takes a lot of effort due to pain on knee flexion.       Result Review :                  Large Joint Arthrocentesis: R knee  Date/Time: 9/26/2022 10:08 AM  Consent given by: patient  Site marked: site marked  Timeout: Immediately prior to procedure a time out was called to verify the correct patient, procedure, equipment, support staff " and site/side marked as required   Supporting Documentation  Indications: pain   Procedure Details  Location: knee - R knee  Preparation: Patient was prepped and draped in the usual sterile fashion  Needle size: 25 G  Approach: anteromedial  Medications administered: methylPREDNISolone acetate 80 MG/ML; 2 mL lidocaine (cardiac)  Patient tolerance: patient tolerated the procedure well with no immediate complications               Assessment   Assessment and Plan    Problem List Items Addressed This Visit        Musculoskeletal and Injuries    Primary osteoarthritis of right knee    Relevant Orders    Large Joint Arthrocentesis: R knee          Follow Up   · Injected patient's right knee joint(s)with Depo-Medrol from an anteromedial approach   · Compression/brace   · Rest, ice, compression, and elevation (RICE) therapy  · OTC Alternate Ibuprofen and Tylenol as needed  · Follow up in 3 month(s)  • Patient was given instructions and counseling regarding his condition or for health maintenance advice. Please see specific information pulled into the AVS if appropriate.     Tono Archuleta MD   Date of Encounter: 9/26/2022        EMR Dragon/Transcription disclaimer:  Much of this encounter note is an electronic transcription/translation of spoken language to printed text. The electronic translation of spoken language may permit erroneous, or at times, nonsensical words or phrases to be inadvertently transcribed; Although I have reviewed the note for such errors, some may still exist.

## 2023-03-20 ENCOUNTER — OFFICE VISIT (OUTPATIENT)
Dept: FAMILY MEDICINE CLINIC | Age: 73
End: 2023-03-20
Payer: MEDICARE

## 2023-03-20 VITALS — HEIGHT: 66 IN | BODY MASS INDEX: 26.78 KG/M2 | TEMPERATURE: 98.7 F | OXYGEN SATURATION: 96 % | WEIGHT: 166.6 LBS

## 2023-03-20 DIAGNOSIS — Z23 ENCOUNTER FOR IMMUNIZATION: ICD-10-CM

## 2023-03-20 DIAGNOSIS — I10 ESSENTIAL HYPERTENSION: Primary | ICD-10-CM

## 2023-03-20 DIAGNOSIS — R42 DIZZINESS: ICD-10-CM

## 2023-03-20 DIAGNOSIS — Z11.59 SCREENING FOR VIRAL DISEASE: ICD-10-CM

## 2023-03-20 DIAGNOSIS — R19.7 DIARRHEA, UNSPECIFIED TYPE: ICD-10-CM

## 2023-03-20 DIAGNOSIS — Z13.6 SCREENING FOR CARDIOVASCULAR CONDITION: ICD-10-CM

## 2023-03-20 DIAGNOSIS — Z12.5 SCREENING FOR MALIGNANT NEOPLASM OF PROSTATE: ICD-10-CM

## 2023-03-20 DIAGNOSIS — R41.3 MEMORY CHANGES: ICD-10-CM

## 2023-03-20 DIAGNOSIS — Z86.39 HISTORY OF THYROID DISORDER: ICD-10-CM

## 2023-03-20 DIAGNOSIS — R35.0 URINARY FREQUENCY: ICD-10-CM

## 2023-03-20 PROBLEM — Z78.9 STATIN NOT TOLERATED: Status: ACTIVE | Noted: 2018-01-28

## 2023-03-20 PROBLEM — I71.40 ABDOMINAL AORTIC ANEURYSM: Status: ACTIVE | Noted: 2019-01-23

## 2023-03-20 PROCEDURE — 99214 OFFICE O/P EST MOD 30 MIN: CPT | Performed by: NURSE PRACTITIONER

## 2023-03-20 PROCEDURE — 90677 PCV20 VACCINE IM: CPT | Performed by: NURSE PRACTITIONER

## 2023-03-20 PROCEDURE — 1159F MED LIST DOCD IN RCRD: CPT | Performed by: NURSE PRACTITIONER

## 2023-03-20 PROCEDURE — G0009 ADMIN PNEUMOCOCCAL VACCINE: HCPCS | Performed by: NURSE PRACTITIONER

## 2023-03-20 RX ORDER — IBUPROFEN 200 MG
200 TABLET ORAL EVERY 6 HOURS PRN
COMMUNITY

## 2023-03-20 RX ORDER — METOPROLOL TARTRATE 50 MG/1
50 TABLET, FILM COATED ORAL DAILY
COMMUNITY

## 2023-03-20 RX ORDER — ESCITALOPRAM OXALATE 5 MG/1
1 TABLET ORAL DAILY
COMMUNITY
Start: 2022-12-30

## 2023-03-20 NOTE — PROGRESS NOTES
Chief Complaint  Russell Hernandez presents to [facility] for Establish Care, Dizziness (Pt states that he's having dizzy spells ), and Diarrhea (Pt states hes had diarrhea X 2 months /Also having trouble controlling bladder)      Subjective     History of Present Illness  Russell is here today to establish care.  He was a previous patient of Dr. San at [facility].  His last appointment with her was in August 2022.  He reports that there were really nothing significant that he can recall from his visit.  He also is seen by cardiology-YAYO Guzman and reports he is unsure when his next appointment is but he has plans to call and get this follow-up appointment scheduled.  He is also followed by orthopedics previously Dr. Claros but has since changed to Dr. Henry for management of chronic right knee problems.    Diarrhea: Patient complains of diarrhea. Onset of diarrhea was several months ago. Diarrhea is occurring approximately 7-10 times per day. Patient describes diarrhea as watery. Diarrhea has been associated with nothing.  Patient denies fever, illness in household contacts, recent antibiotic use, recent travel, significant abdominal pain.  Previous visits for diarrhea: yes, last seen by someone at [facility] - had colonoscopy and was told polyps  Unsure if months/weeks ago by unsure. Evaluation to date: colonoscopy. Treatment to date: nothing that he can remember     Dizziness  - this has been going on for an undisclosed amount of time but thinks a few days.  He does not think this is associated with his diarrhea however he is unsure.  He does also have a frontal headache.  He reports that he has also been noticing some confusion but he feels this has been slowly progressing over the last few years.  He does not have any vision disturbance outside of his normal.  He denies any allergy type symptoms.    Russell also reports that he has been having some urinary  urgency intermittently.  This is not an ongoing problem however it has become more frequent.  He reports that his urine stream is unchanged.  He does have some occasional incontinence.  No burning.    Upon his review of his last office note that he brings with him today, it appears that he has had some thyroid problems in the past but there are no specifics listed in the note that was visible today.  We are requesting records from that office.        Assessment and Plan   Regarding his symptoms of dizziness and memory change; we will plan on checking labs to see if there is an indication for his symptoms otherwise we will plan to move forward imaging of his head    Diagnoses and all orders for this visit:    1. Essential hypertension (Primary)  Comments:  Blood pressure elevated today however we will obtain records from Dr. San's office for review of his previous medications, recommend follow-up with cardiolo    2. Diarrhea, unspecified type  Comments:  We will check stool samples, obtain records from previous colonoscopy, follow-up after labs for further instructions  Orders:  -     CBC and differential; Future  -     Clostridioides difficile Toxin, PCR - Stool, Per Rectum; Future  -     Enteric Parasite Panel - Stool, Per Rectum; Future  -     Enteric Bacterial Panel - Stool, Per Rectum; Future    3. Dizziness  Comments:  No red flags today on exam, orthostatic blood pressures acceptable, will review labs and have him follow-up after for further recommendations    4. Urinary frequency  Comments:  We will check a urine and PSA level request lab results from Dr. San's office follow-up based on findings  Orders:  -     Urinalysis With Culture If Indicated -; Future  -     TSH Rfx On Abnormal To Free T4; Future    5. History of thyroid disorder  Comments:  I am uncertain of his thyroid disorder we will repeat a thyroid level request records from Dr. San's office follow-up based on results  Orders:  -      "TSH Rfx On Abnormal To Free T4; Future    6. Screening for viral disease  -     Hepatitis C antibody; Future    7. Screening for malignant neoplasm of prostate  -     PSA SCREENING; Future    8. Encounter for immunization  -     Pneumococcal Conjugate Vaccine 20-Valent (PCV20)    9. Screening for cardiovascular condition  -     Comprehensive metabolic panel; Future    10. Memory changes  Comments:  Uncertain if this is acute or chronic we will check labs, head images if no indication from labs for his symptoms        Follow Up   Return for Pending lab results.      No orders of the defined types were placed in this encounter.      Medications Discontinued During This Encounter   Medication Reason   • metoprolol succinate XL (TOPROL-XL) 25 MG 24 hr tablet Dose adjustment   • Brilinta 90 MG tablet tablet Discontinued by another clinician   • losartan (COZAAR) 50 MG tablet Discontinued by another clinician   • sucralfate (CARAFATE) 1 g tablet Discontinued by another clinician            Review of Systems   Neurological: Positive for memory problem.       Objective     Vitals:    03/20/23 1057 03/20/23 1059 03/20/23 1102   Temp: 98.7 °F (37.1 °C)     TempSrc: Oral     SpO2: 97% 97% 96%   Weight: 75.6 kg (166 lb 9.6 oz)     Height: 167.6 cm (66\")       Body mass index is 26.89 kg/m².   Vitals:    03/20/23 1057 03/20/23 1059 03/20/23 1102   Orthostatic BP: 147/94 149/84 155/90   Orthostatic Pulse: 79 74 82   Patient Position: Sitting Lying Standing     Physical Exam  Vitals reviewed.   Constitutional:       Appearance: Normal appearance.   HENT:      Head: Normocephalic.   Eyes:      Pupils: Pupils are equal, round, and reactive to light.   Neck:      Vascular: No carotid bruit.   Cardiovascular:      Rate and Rhythm: Normal rate and regular rhythm.      Heart sounds: No murmur heard.  Pulmonary:      Effort: Pulmonary effort is normal.      Breath sounds: Normal breath sounds.   Musculoskeletal:         General: Normal " range of motion.   Neurological:      Mental Status: He is alert.      Cranial Nerves: No facial asymmetry.      Motor: Weakness present.      Coordination: Coordination is intact.   Psychiatric:         Mood and Affect: Mood normal.         Behavior: Behavior normal.            Result Review                       Allergies   Allergen Reactions   • Statins Myalgia      Past Medical History:   Diagnosis Date   • Agitation    • Alcoholism /alcohol abuse    • Depression    • Gout    • HTN (hypertension)    • Poor concentration    • Sensitivity to light    • Sleep apnea    • Sleep disorder      Current Outpatient Medications   Medication Sig Dispense Refill   • allopurinol (ZYLOPRIM) 300 MG tablet Take 1 tablet by mouth Daily.     • aspirin 81 MG chewable tablet Chew 1 tablet Daily.     • cholecalciferol (VITAMIN D3) 25 MCG (1000 UT) tablet Take 1 tablet by mouth Daily.     • escitalopram (LEXAPRO) 5 MG tablet Take 1 tablet by mouth Daily.     • ibuprofen (ADVIL,MOTRIN) 200 MG tablet Take 1 tablet by mouth Every 6 (Six) Hours As Needed for Mild Pain.     • metoprolol tartrate (LOPRESSOR) 50 MG tablet Take 1 tablet by mouth Daily.     • vitamin B-12 (CYANOCOBALAMIN) 100 MCG tablet Vitamin B12       No current facility-administered medications for this visit.     Past Surgical History:   Procedure Laterality Date   • JOINT REPLACEMENT Left     Knee   • KNEE SURGERY        Health Maintenance Due   Topic Date Due   • COLORECTAL CANCER SCREENING  Never done   • TDAP/TD VACCINES (1 - Tdap) Never done   • ZOSTER VACCINE (1 of 2) Never done   • HEPATITIS C SCREENING  Never done   • ANNUAL WELLNESS VISIT  01/05/2022   • LIPID PANEL  11/02/2022      Immunization History   Administered Date(s) Administered   • COVID-19 (PFIZER) BIVALENT BOOSTER 12+YRS 10/18/2022   • COVID-19 (PFIZER) PURPLE CAP 03/22/2021, 04/12/2021, 10/29/2021   • Fluzone High-Dose 65+yrs 10/06/2021, 10/07/2022   • Pneumococcal Conjugate 20-Valent (PCV20)  03/20/2023   • Pneumococcal Polysaccharide (PPSV23) 07/08/2020           EMR Dragon/Transcription disclaimer:  This encounter note may contain some electronic transcription/translation of spoken language to printed text. The electronic translation of spoken language may permit erroneous, or at times, nonsensical words or phrases to be inadvertently transcribed; Although I have reviewed the note for such errors, some may still exist.        Idania Bishop, APRN

## 2023-03-21 ENCOUNTER — LAB (OUTPATIENT)
Dept: LAB | Facility: HOSPITAL | Age: 73
End: 2023-03-21
Payer: MEDICARE

## 2023-03-21 DIAGNOSIS — R35.0 URINARY FREQUENCY: ICD-10-CM

## 2023-03-21 DIAGNOSIS — Z13.6 SCREENING FOR CARDIOVASCULAR CONDITION: ICD-10-CM

## 2023-03-21 DIAGNOSIS — R19.7 DIARRHEA, UNSPECIFIED TYPE: ICD-10-CM

## 2023-03-21 DIAGNOSIS — Z12.5 SCREENING FOR MALIGNANT NEOPLASM OF PROSTATE: ICD-10-CM

## 2023-03-21 DIAGNOSIS — Z11.59 SCREENING FOR VIRAL DISEASE: ICD-10-CM

## 2023-03-21 DIAGNOSIS — Z86.39 HISTORY OF THYROID DISORDER: ICD-10-CM

## 2023-03-21 LAB
ALBUMIN SERPL-MCNC: 4.3 G/DL (ref 3.5–5.2)
ALBUMIN/GLOB SERPL: 1.8 G/DL
ALP SERPL-CCNC: 77 U/L (ref 39–117)
ALT SERPL W P-5'-P-CCNC: 37 U/L (ref 1–41)
ANION GAP SERPL CALCULATED.3IONS-SCNC: 12.1 MMOL/L (ref 5–15)
AST SERPL-CCNC: 38 U/L (ref 1–40)
BACTERIA UR QL AUTO: ABNORMAL /HPF
BASOPHILS # BLD AUTO: 0.02 10*3/MM3 (ref 0–0.2)
BASOPHILS NFR BLD AUTO: 0.4 % (ref 0–1.5)
BILIRUB SERPL-MCNC: 0.6 MG/DL (ref 0–1.2)
BILIRUB UR QL STRIP: NEGATIVE
BUN SERPL-MCNC: 21 MG/DL (ref 8–23)
BUN/CREAT SERPL: 22.8 (ref 7–25)
CALCIUM SPEC-SCNC: 9.3 MG/DL (ref 8.6–10.5)
CHLORIDE SERPL-SCNC: 101 MMOL/L (ref 98–107)
CLARITY UR: CLEAR
CO2 SERPL-SCNC: 23.9 MMOL/L (ref 22–29)
COLOR UR: YELLOW
CREAT SERPL-MCNC: 0.92 MG/DL (ref 0.76–1.27)
DEPRECATED RDW RBC AUTO: 44 FL (ref 37–54)
EGFRCR SERPLBLD CKD-EPI 2021: 88.4 ML/MIN/1.73
EOSINOPHIL # BLD AUTO: 0.15 10*3/MM3 (ref 0–0.4)
EOSINOPHIL NFR BLD AUTO: 3.1 % (ref 0.3–6.2)
ERYTHROCYTE [DISTWIDTH] IN BLOOD BY AUTOMATED COUNT: 12.1 % (ref 12.3–15.4)
GLOBULIN UR ELPH-MCNC: 2.4 GM/DL
GLUCOSE SERPL-MCNC: 101 MG/DL (ref 65–99)
GLUCOSE UR STRIP-MCNC: NEGATIVE MG/DL
HCT VFR BLD AUTO: 38.3 % (ref 37.5–51)
HCV AB SER DONR QL: NORMAL
HGB BLD-MCNC: 12.9 G/DL (ref 13–17.7)
HGB UR QL STRIP.AUTO: ABNORMAL
IMM GRANULOCYTES # BLD AUTO: 0.03 10*3/MM3 (ref 0–0.05)
IMM GRANULOCYTES NFR BLD AUTO: 0.6 % (ref 0–0.5)
KETONES UR QL STRIP: NEGATIVE
LEUKOCYTE ESTERASE UR QL STRIP.AUTO: NEGATIVE
LYMPHOCYTES # BLD AUTO: 1.78 10*3/MM3 (ref 0.7–3.1)
LYMPHOCYTES NFR BLD AUTO: 37.3 % (ref 19.6–45.3)
MCH RBC QN AUTO: 33.6 PG (ref 26.6–33)
MCHC RBC AUTO-ENTMCNC: 33.7 G/DL (ref 31.5–35.7)
MCV RBC AUTO: 99.7 FL (ref 79–97)
MONOCYTES # BLD AUTO: 0.53 10*3/MM3 (ref 0.1–0.9)
MONOCYTES NFR BLD AUTO: 11.1 % (ref 5–12)
NEUTROPHILS NFR BLD AUTO: 2.26 10*3/MM3 (ref 1.7–7)
NEUTROPHILS NFR BLD AUTO: 47.5 % (ref 42.7–76)
NITRITE UR QL STRIP: NEGATIVE
PH UR STRIP.AUTO: 5.5 [PH] (ref 5–8)
PLATELET # BLD AUTO: 189 10*3/MM3 (ref 140–450)
PMV BLD AUTO: 9.8 FL (ref 6–12)
POTASSIUM SERPL-SCNC: 4.7 MMOL/L (ref 3.5–5.2)
PROT SERPL-MCNC: 6.7 G/DL (ref 6–8.5)
PROT UR QL STRIP: ABNORMAL
PSA SERPL-MCNC: 1.09 NG/ML (ref 0–4)
RBC # BLD AUTO: 3.84 10*6/MM3 (ref 4.14–5.8)
RBC # UR STRIP: ABNORMAL /HPF
REF LAB TEST METHOD: ABNORMAL
SODIUM SERPL-SCNC: 137 MMOL/L (ref 136–145)
SP GR UR STRIP: 1.02 (ref 1–1.03)
SQUAMOUS #/AREA URNS HPF: ABNORMAL /HPF
TSH SERPL DL<=0.05 MIU/L-ACNC: 1.43 UIU/ML (ref 0.27–4.2)
UROBILINOGEN UR QL STRIP: ABNORMAL
WBC # UR STRIP: ABNORMAL /HPF
WBC NRBC COR # BLD: 4.77 10*3/MM3 (ref 3.4–10.8)

## 2023-03-21 PROCEDURE — 36415 COLL VENOUS BLD VENIPUNCTURE: CPT

## 2023-03-21 PROCEDURE — 80053 COMPREHEN METABOLIC PANEL: CPT

## 2023-03-21 PROCEDURE — 86803 HEPATITIS C AB TEST: CPT

## 2023-03-21 PROCEDURE — G0103 PSA SCREENING: HCPCS

## 2023-03-21 PROCEDURE — 84443 ASSAY THYROID STIM HORMONE: CPT

## 2023-03-21 PROCEDURE — 81001 URINALYSIS AUTO W/SCOPE: CPT

## 2023-03-21 PROCEDURE — 85025 COMPLETE CBC W/AUTO DIFF WBC: CPT

## 2023-03-27 ENCOUNTER — TELEPHONE (OUTPATIENT)
Dept: FAMILY MEDICINE CLINIC | Age: 73
End: 2023-03-27
Payer: MEDICARE

## 2023-03-27 NOTE — TELEPHONE ENCOUNTER
----- Message from Shani Martin RN sent at 3/27/2023  8:35 AM EDT -----      ----- Message -----  From: SYSTEM  Sent: 3/25/2023   1:16 AM EDT  To: [facility] [place]

## 2023-03-31 ENCOUNTER — LAB (OUTPATIENT)
Dept: LAB | Facility: HOSPITAL | Age: 73
End: 2023-03-31
Payer: MEDICARE

## 2023-03-31 DIAGNOSIS — R19.7 DIARRHEA, UNSPECIFIED TYPE: ICD-10-CM

## 2023-03-31 LAB
027 TOXIN: NORMAL
C DIFF TOX GENS STL QL NAA+PROBE: NEGATIVE

## 2023-03-31 PROCEDURE — 87506 IADNA-DNA/RNA PROBE TQ 6-11: CPT

## 2023-03-31 PROCEDURE — 87493 C DIFF AMPLIFIED PROBE: CPT

## 2023-04-01 LAB
C COLI+JEJ+UPSA DNA STL QL NAA+NON-PROBE: NOT DETECTED
CRYPTOSP DNA STL QL NAA+NON-PROBE: NOT DETECTED
E HISTOLYT DNA STL QL NAA+NON-PROBE: NOT DETECTED
EC STX1+STX2 GENES STL QL NAA+NON-PROBE: NOT DETECTED
G LAMBLIA DNA STL QL NAA+NON-PROBE: NOT DETECTED
S ENT+BONG DNA STL QL NAA+NON-PROBE: NOT DETECTED
SHIGELLA SP+EIEC IPAH ST NAA+NON-PROBE: NOT DETECTED

## 2023-04-07 ENCOUNTER — TELEPHONE (OUTPATIENT)
Dept: FAMILY MEDICINE CLINIC | Age: 73
End: 2023-04-07
Payer: MEDICARE

## 2023-04-07 NOTE — TELEPHONE ENCOUNTER
Pt is requesting results from stool  labs and the colonoscopy that he recently had because he hasn't heard anything and still having the same issues that he was having when he came in.

## 2023-04-18 ENCOUNTER — OFFICE VISIT (OUTPATIENT)
Dept: FAMILY MEDICINE CLINIC | Age: 73
End: 2023-04-18
Payer: MEDICARE

## 2023-04-18 VITALS
OXYGEN SATURATION: 94 % | DIASTOLIC BLOOD PRESSURE: 88 MMHG | WEIGHT: 170.2 LBS | BODY MASS INDEX: 27.35 KG/M2 | HEIGHT: 66 IN | SYSTOLIC BLOOD PRESSURE: 147 MMHG | HEART RATE: 87 BPM | TEMPERATURE: 98.5 F

## 2023-04-18 DIAGNOSIS — R35.0 URINARY FREQUENCY: ICD-10-CM

## 2023-04-18 DIAGNOSIS — R05.1 ACUTE COUGH: Primary | ICD-10-CM

## 2023-04-18 DIAGNOSIS — J30.89 SEASONAL ALLERGIC RHINITIS DUE TO OTHER ALLERGIC TRIGGER: ICD-10-CM

## 2023-04-18 DIAGNOSIS — R19.7 DIARRHEA, UNSPECIFIED TYPE: ICD-10-CM

## 2023-04-18 DIAGNOSIS — R80.9 PROTEINURIA, UNSPECIFIED TYPE: ICD-10-CM

## 2023-04-18 PROCEDURE — 1160F RVW MEDS BY RX/DR IN RCRD: CPT | Performed by: NURSE PRACTITIONER

## 2023-04-18 PROCEDURE — 1159F MED LIST DOCD IN RCRD: CPT | Performed by: NURSE PRACTITIONER

## 2023-04-18 PROCEDURE — 99214 OFFICE O/P EST MOD 30 MIN: CPT | Performed by: NURSE PRACTITIONER

## 2023-04-18 PROCEDURE — 3079F DIAST BP 80-89 MM HG: CPT | Performed by: NURSE PRACTITIONER

## 2023-04-18 PROCEDURE — 3077F SYST BP >= 140 MM HG: CPT | Performed by: NURSE PRACTITIONER

## 2023-04-18 RX ORDER — MONTELUKAST SODIUM 4 MG/1
1 TABLET, CHEWABLE ORAL 2 TIMES DAILY
Qty: 28 TABLET | Refills: 0 | Status: SHIPPED | OUTPATIENT
Start: 2023-04-18 | End: 2023-05-02

## 2023-04-18 RX ORDER — CETIRIZINE HYDROCHLORIDE 10 MG/1
10 TABLET ORAL DAILY
Qty: 30 TABLET | Refills: 0 | Status: SHIPPED | OUTPATIENT
Start: 2023-04-18

## 2023-04-18 RX ORDER — ASPIRIN 81 MG/1
81 TABLET ORAL DAILY
COMMUNITY
End: 2023-04-18 | Stop reason: SDDI

## 2023-04-18 NOTE — PROGRESS NOTES
Chief Complaint  Russell Hernandez presents to [facility] for Hypertension (Follow up)      Subjective     History of Present Illness  Russell is here to follow-up on his complaint of diarrhea.  He reports that this has been ongoing for 3 months.  He only recently new medication he had added was the Lexapro which was back in August prior to the start of his diarrhea.  We did discuss if he had and illness around the time this started and he thinks there may have been some sort of illness but did not specifically associated with this starting.  He reports that his diarrhea is so bad he often has accidents and urgency with his symptoms.  There are no complaints of abdominal pain.  We did recently get some stool studies and they were all found to be negative.    He also reports having some complaints of congestion starting most mornings.    This has been going on for a few months about the same time as the diarrhea but unsure if they are related.  He does report a lot of postnasal drip and room rhinitis.  He denies taking any over-the-counter medications for any of his symptoms.         Assessment and Plan       Diagnoses and all orders for this visit:    1. Acute cough (Primary)  Comments:  will try to treat for possible allergies with cetirizine x 4 weeks then follow up 6 weeks   Orders:  -     cetirizine (zyrTEC) 10 MG tablet; Take 1 tablet by mouth Daily.  Dispense: 30 tablet; Refill: 0    2. Diarrhea, unspecified type  Comments:  I will try a short course of colestipol to see if this improves the diarrhea if ineffective consider Lactinex or possible Imodium and referral to gastro  Orders:  -     colestipol (COLESTID) 1 g tablet; Take 1 tablet by mouth 2 (Two) Times a Day for 14 days.  Dispense: 28 tablet; Refill: 0    3. Proteinuria, unspecified type  Comments:  We will plan on repeating his urine if we get his diarrhea under better control at the next visit repeat at that  "time  Orders:  -     Cancel: Urinalysis With Microscopic - Urine, Clean Catch; Future    4. Seasonal allergic rhinitis due to other allergic trigger  -     cetirizine (zyrTEC) 10 MG tablet; Take 1 tablet by mouth Daily.  Dispense: 30 tablet; Refill: 0    5. Urinary frequency  Comments:  We will pursue further testing or medication management at a future visit when other acute/chronic symptoms resolved        Follow Up   Return in about 6 weeks (around 5/30/2023) for Recheck.      New Medications Ordered This Visit   Medications   • colestipol (COLESTID) 1 g tablet     Sig: Take 1 tablet by mouth 2 (Two) Times a Day for 14 days.     Dispense:  28 tablet     Refill:  0   • cetirizine (zyrTEC) 10 MG tablet     Sig: Take 1 tablet by mouth Daily.     Dispense:  30 tablet     Refill:  0       Medications Discontinued During This Encounter   Medication Reason   • aspirin 81 MG EC tablet Non-compliance            Review of Systems   Respiratory: Positive for choking and shortness of breath.        Objective     Vitals:    04/18/23 1027   BP: 147/88   BP Location: Left arm   Patient Position: Sitting   Cuff Size: Adult   Pulse: 87   Temp: 98.5 °F (36.9 °C)   TempSrc: Oral   SpO2: 94%   Weight: 77.2 kg (170 lb 3.2 oz)   Height: 167.6 cm (66\")     Body mass index is 27.47 kg/m².     Physical Exam  Vitals reviewed.   Constitutional:       Appearance: Normal appearance.   HENT:      Head: Normocephalic.   Eyes:      Pupils: Pupils are equal, round, and reactive to light.   Cardiovascular:      Rate and Rhythm: Normal rate and regular rhythm.      Heart sounds: No murmur heard.  Pulmonary:      Effort: Pulmonary effort is normal.      Breath sounds: Normal breath sounds.   Musculoskeletal:         General: Normal range of motion.   Neurological:      Mental Status: He is alert.   Psychiatric:         Mood and Affect: Mood normal.         Behavior: Behavior normal.            Result Review                       Allergies "   Allergen Reactions   • Statins Myalgia      Past Medical History:   Diagnosis Date   • Agitation    • Alcoholism /alcohol abuse    • Depression    • Gout    • HTN (hypertension)    • Poor concentration    • Sensitivity to light    • Sleep apnea    • Sleep disorder      Current Outpatient Medications   Medication Sig Dispense Refill   • allopurinol (ZYLOPRIM) 300 MG tablet Take 1 tablet by mouth Daily.     • aspirin 81 MG chewable tablet Chew 1 tablet Daily.     • cholecalciferol (VITAMIN D3) 25 MCG (1000 UT) tablet Take 1 tablet by mouth Daily.     • escitalopram (LEXAPRO) 5 MG tablet Take 1 tablet by mouth Daily.     • ibuprofen (ADVIL,MOTRIN) 200 MG tablet Take 1 tablet by mouth Every 6 (Six) Hours As Needed for Mild Pain.     • metoprolol tartrate (LOPRESSOR) 50 MG tablet Take 1 tablet by mouth Daily.     • vitamin B-12 (CYANOCOBALAMIN) 100 MCG tablet Take  by mouth Daily.     • cetirizine (zyrTEC) 10 MG tablet Take 1 tablet by mouth Daily. 30 tablet 0   • colestipol (COLESTID) 1 g tablet Take 1 tablet by mouth 2 (Two) Times a Day for 14 days. 28 tablet 0     No current facility-administered medications for this visit.     Past Surgical History:   Procedure Laterality Date   • JOINT REPLACEMENT Left     Knee   • KNEE SURGERY        Health Maintenance Due   Topic Date Due   • TDAP/TD VACCINES (1 - Tdap) Never done   • ZOSTER VACCINE (1 of 2) Never done   • ANNUAL WELLNESS VISIT  01/05/2022   • LIPID PANEL  11/02/2022      Immunization History   Administered Date(s) Administered   • COVID-19 (PFIZER) BIVALENT BOOSTER 12+YRS 10/18/2022   • COVID-19 (PFIZER) PURPLE CAP 03/22/2021, 04/12/2021, 10/29/2021   • Fluzone High-Dose 65+yrs 10/06/2021, 10/07/2022   • Pneumococcal Conjugate 20-Valent (PCV20) 03/20/2023   • Pneumococcal Polysaccharide (PPSV23) 07/08/2020         Part of this note may be an electronic transcription/translation of spoken language to printed   text using the Dragon Dictation System.      Idania  YAYO Bishop

## 2023-05-30 ENCOUNTER — OFFICE VISIT (OUTPATIENT)
Dept: FAMILY MEDICINE CLINIC | Age: 73
End: 2023-05-30

## 2023-05-30 VITALS
HEART RATE: 99 BPM | SYSTOLIC BLOOD PRESSURE: 134 MMHG | DIASTOLIC BLOOD PRESSURE: 86 MMHG | OXYGEN SATURATION: 97 % | TEMPERATURE: 99.8 F | BODY MASS INDEX: 27.13 KG/M2 | WEIGHT: 168.8 LBS | HEIGHT: 66 IN

## 2023-05-30 DIAGNOSIS — I71.40 ABDOMINAL AORTIC ANEURYSM (AAA) WITHOUT RUPTURE, UNSPECIFIED PART: ICD-10-CM

## 2023-05-30 DIAGNOSIS — R15.2 FECAL URGENCY: ICD-10-CM

## 2023-05-30 DIAGNOSIS — I25.10 CORONARY ARTERY DISEASE INVOLVING NATIVE CORONARY ARTERY OF NATIVE HEART WITHOUT ANGINA PECTORIS: ICD-10-CM

## 2023-05-30 DIAGNOSIS — I10 ESSENTIAL HYPERTENSION: ICD-10-CM

## 2023-05-30 DIAGNOSIS — F41.1 GENERALIZED ANXIETY DISORDER: ICD-10-CM

## 2023-05-30 DIAGNOSIS — R19.7 DIARRHEA, UNSPECIFIED TYPE: Primary | ICD-10-CM

## 2023-05-30 DIAGNOSIS — M1A.09X0 IDIOPATHIC CHRONIC GOUT OF MULTIPLE SITES WITHOUT TOPHUS: ICD-10-CM

## 2023-05-30 DIAGNOSIS — R80.9 PROTEINURIA, UNSPECIFIED TYPE: ICD-10-CM

## 2023-05-30 LAB
BACTERIA UR QL AUTO: NORMAL /HPF
BILIRUB UR QL STRIP: NEGATIVE
CLARITY UR: CLEAR
COLOR UR: YELLOW
GLUCOSE UR STRIP-MCNC: NEGATIVE MG/DL
HGB UR QL STRIP.AUTO: ABNORMAL
KETONES UR QL STRIP: NEGATIVE
LEUKOCYTE ESTERASE UR QL STRIP.AUTO: NEGATIVE
NITRITE UR QL STRIP: NEGATIVE
PH UR STRIP.AUTO: 5.5 [PH] (ref 5–8)
PROT UR QL STRIP: ABNORMAL
RBC # UR STRIP: NORMAL /HPF
REF LAB TEST METHOD: NORMAL
SP GR UR STRIP: >=1.03 (ref 1–1.03)
SQUAMOUS #/AREA URNS HPF: NORMAL /HPF
UROBILINOGEN UR QL STRIP: ABNORMAL
WBC # UR STRIP: NORMAL /HPF

## 2023-05-30 PROCEDURE — 3075F SYST BP GE 130 - 139MM HG: CPT | Performed by: NURSE PRACTITIONER

## 2023-05-30 PROCEDURE — 81001 URINALYSIS AUTO W/SCOPE: CPT | Performed by: NURSE PRACTITIONER

## 2023-05-30 PROCEDURE — 3079F DIAST BP 80-89 MM HG: CPT | Performed by: NURSE PRACTITIONER

## 2023-05-30 PROCEDURE — 99214 OFFICE O/P EST MOD 30 MIN: CPT | Performed by: NURSE PRACTITIONER

## 2023-05-30 RX ORDER — ALLOPURINOL 300 MG/1
300 TABLET ORAL DAILY
Qty: 90 TABLET | Refills: 1 | Status: SHIPPED | OUTPATIENT
Start: 2023-05-30

## 2023-05-30 RX ORDER — DICYCLOMINE HYDROCHLORIDE 10 MG/1
10 CAPSULE ORAL
Qty: 120 CAPSULE | Refills: 0 | Status: SHIPPED | OUTPATIENT
Start: 2023-05-30

## 2023-05-30 RX ORDER — METOPROLOL TARTRATE 50 MG/1
50 TABLET, FILM COATED ORAL DAILY
Qty: 90 TABLET | Refills: 1 | Status: SHIPPED | OUTPATIENT
Start: 2023-05-30 | End: 2023-05-30

## 2023-05-30 RX ORDER — METOPROLOL SUCCINATE 50 MG/1
50 TABLET, EXTENDED RELEASE ORAL DAILY
Qty: 90 TABLET | Refills: 1 | Status: SHIPPED | OUTPATIENT
Start: 2023-05-30

## 2023-05-30 RX ORDER — ESCITALOPRAM OXALATE 5 MG/1
5 TABLET ORAL DAILY
Qty: 90 TABLET | Refills: 1 | Status: SHIPPED | OUTPATIENT
Start: 2023-05-30

## 2023-05-30 NOTE — PROGRESS NOTES
Chief Complaint  Russell Hernandez presents to [facility] for Diarrhea (Follow up ) and Cough      Subjective     History of Present Illness  Russell is here today to follow-up on chronic diarrhea.  At his last visit in April we will put him on a short course of colestipol he reports that this has helped somewhat to bulk up his stool however he still does have some frequency and urgency in his bowel movements.  We did discuss and the next step would be to refer to gastroenterology he did have a colonoscopy within the last 2 years and it was reported as normal.  He would like to have some colestipol on hand for him to take as needed and he is willing to try a new prescription of Bentyl to see if this helps.  At this time he is not interested in a referral for gastroenterologist and we will continue to follow-up with this as he feels appropriate based on how he feels this is interrupting his life.  He does not have any weight loss with this finding..      Russell is also here to follow-up for proteinuria.  He did have some protein noted in his urine on previous evaluation we will repeat this urine today.          Assessment and Plan       Diagnoses and all orders for this visit:    1. Diarrhea, unspecified type (Primary)  Comments:  We will try him on some Bentyl to see if the urgency subsides defer referral to gastro at this time based on patient's request follow-up at next appointment  Orders:  -     dicyclomine (BENTYL) 10 MG capsule; Take 1 capsule by mouth 4 (Four) Times a Day Before Meals & at Bedtime. May take only as needed based on symptoms  Dispense: 120 capsule; Refill: 0    2. Fecal urgency  Comments:  We will try Bentyl defer gastro referral based on patient request  Orders:  -     dicyclomine (BENTYL) 10 MG capsule; Take 1 capsule by mouth 4 (Four) Times a Day Before Meals & at Bedtime. May take only as needed based on symptoms  Dispense: 120 capsule; Refill: 0    3.  Proteinuria, unspecified type  Comments:  Ongoing monitoring of his urine  Orders:  -     Urinalysis With Microscopic - Urine, Clean Catch; Future  -     Urinalysis With Microscopic - Urine, Clean Catch    4. Coronary artery disease (stents)   -     metoprolol succinate XL (Toprol XL) 50 MG 24 hr tablet; Take 1 tablet by mouth Daily.  Dispense: 90 tablet; Refill: 1    5. Abdominal aortic aneurysm (AAA) without rupture, unspecified part  Comments:  Continue metoprolol based on guidelines and for prevention  Orders:  -     metoprolol succinate XL (Toprol XL) 50 MG 24 hr tablet; Take 1 tablet by mouth Daily.  Dispense: 90 tablet; Refill: 1    6. Essential hypertension  Comments:  Continue current treatment follow-up at next appointment  Orders:  -     Discontinue: metoprolol tartrate (LOPRESSOR) 50 MG tablet; Take 1 tablet by mouth Daily.  Dispense: 90 tablet; Refill: 1  -     metoprolol succinate XL (Toprol XL) 50 MG 24 hr tablet; Take 1 tablet by mouth Daily.  Dispense: 90 tablet; Refill: 1    7. Idiopathic chronic gout of multiple sites without tophus  Comments:  Continue current treatment ongoing monitoring of uric acid levels well controlled per his report  Orders:  -     allopurinol (ZYLOPRIM) 300 MG tablet; Take 1 tablet by mouth Daily.  Dispense: 90 tablet; Refill: 1    8. Generalized anxiety disorder  Comments:  Continue current treatment follow-up at next appointment  Orders:  -     escitalopram (LEXAPRO) 5 MG tablet; Take 1 tablet by mouth Daily.  Dispense: 90 tablet; Refill: 1        Follow Up   Return in about 6 months (around 11/30/2023) for Recheck.      New Medications Ordered This Visit   Medications   • dicyclomine (BENTYL) 10 MG capsule     Sig: Take 1 capsule by mouth 4 (Four) Times a Day Before Meals & at Bedtime. May take only as needed based on symptoms     Dispense:  120 capsule     Refill:  0   • escitalopram (LEXAPRO) 5 MG tablet     Sig: Take 1 tablet by mouth Daily.     Dispense:  90 tablet  "    Refill:  1   • allopurinol (ZYLOPRIM) 300 MG tablet     Sig: Take 1 tablet by mouth Daily.     Dispense:  90 tablet     Refill:  1   • metoprolol succinate XL (Toprol XL) 50 MG 24 hr tablet     Sig: Take 1 tablet by mouth Daily.     Dispense:  90 tablet     Refill:  1       Medications Discontinued During This Encounter   Medication Reason   • colestipol (COLESTID) 1 g tablet *Therapy completed   • cetirizine (zyrTEC) 10 MG tablet Not Efficacious   • allopurinol (ZYLOPRIM) 300 MG tablet Reorder   • escitalopram (LEXAPRO) 5 MG tablet Reorder   • metoprolol tartrate (LOPRESSOR) 50 MG tablet Reorder   • metoprolol tartrate (LOPRESSOR) 50 MG tablet *Error            Review of Systems    Objective     Vitals:    05/30/23 0833   BP: 134/86   BP Location: Left arm   Patient Position: Sitting   Cuff Size: Adult   Pulse: 99   Temp: 99.8 °F (37.7 °C)   TempSrc: Oral   SpO2: 97%   Weight: 76.6 kg (168 lb 12.8 oz)   Height: 167.6 cm (66\")     Body mass index is 27.25 kg/m².     Physical Exam  Vitals reviewed.   Constitutional:       Appearance: Normal appearance.   HENT:      Head: Normocephalic.   Eyes:      Pupils: Pupils are equal, round, and reactive to light.   Cardiovascular:      Rate and Rhythm: Normal rate and regular rhythm.      Heart sounds: No murmur heard.  Pulmonary:      Effort: Pulmonary effort is normal.      Breath sounds: Normal breath sounds.   Abdominal:      Palpations: Abdomen is soft.      Tenderness: There is no abdominal tenderness.   Musculoskeletal:         General: Normal range of motion.   Neurological:      Mental Status: He is alert.   Psychiatric:         Mood and Affect: Mood normal.         Behavior: Behavior normal.            Result Review                       Allergies   Allergen Reactions   • Statins Myalgia      Past Medical History:   Diagnosis Date   • Agitation    • Alcoholism /alcohol abuse    • Depression    • Gout    • HTN (hypertension)    • Poor concentration    • " Sensitivity to light    • Sleep apnea    • Sleep disorder      Current Outpatient Medications   Medication Sig Dispense Refill   • allopurinol (ZYLOPRIM) 300 MG tablet Take 1 tablet by mouth Daily. 90 tablet 1   • aspirin 81 MG chewable tablet Chew 1 tablet Daily.     • cholecalciferol (VITAMIN D3) 25 MCG (1000 UT) tablet Take 1 tablet by mouth Daily.     • escitalopram (LEXAPRO) 5 MG tablet Take 1 tablet by mouth Daily. 90 tablet 1   • ibuprofen (ADVIL,MOTRIN) 200 MG tablet Take 1 tablet by mouth Every 6 (Six) Hours As Needed for Mild Pain.     • vitamin B-12 (CYANOCOBALAMIN) 100 MCG tablet Take  by mouth Daily.     • dicyclomine (BENTYL) 10 MG capsule Take 1 capsule by mouth 4 (Four) Times a Day Before Meals & at Bedtime. May take only as needed based on symptoms 120 capsule 0   • metoprolol succinate XL (Toprol XL) 50 MG 24 hr tablet Take 1 tablet by mouth Daily. 90 tablet 1     No current facility-administered medications for this visit.     Past Surgical History:   Procedure Laterality Date   • JOINT REPLACEMENT Left     Knee   • KNEE SURGERY        Health Maintenance Due   Topic Date Due   • TDAP/TD VACCINES (1 - Tdap) Never done   • ZOSTER VACCINE (1 of 2) Never done   • ANNUAL WELLNESS VISIT  01/05/2022   • LIPID PANEL  11/02/2022      Immunization History   Administered Date(s) Administered   • COVID-19 (PFIZER) BIVALENT 12+YRS 10/18/2022   • COVID-19 (PFIZER) Purple Cap Monovalent 03/22/2021, 04/12/2021, 10/29/2021   • Fluzone High-Dose 65+yrs 10/06/2021, 10/07/2022   • Pneumococcal Conjugate 20-Valent (PCV20) 03/20/2023   • Pneumococcal Polysaccharide (PPSV23) 07/08/2020         Part of this note may be an electronic transcription/translation of spoken language to printed   text using the Dragon Dictation System.      Idania Bishop, APRN

## 2023-06-06 ENCOUNTER — TELEPHONE (OUTPATIENT)
Dept: FAMILY MEDICINE CLINIC | Age: 73
End: 2023-06-06
Payer: MEDICARE

## 2023-06-06 DIAGNOSIS — R15.9 INCONTINENCE OF FECES, UNSPECIFIED FECAL INCONTINENCE TYPE: ICD-10-CM

## 2023-06-06 DIAGNOSIS — R80.9 PROTEINURIA, UNSPECIFIED TYPE: Primary | ICD-10-CM

## 2023-06-06 DIAGNOSIS — R19.7 DIARRHEA, UNSPECIFIED TYPE: ICD-10-CM

## 2023-06-06 DIAGNOSIS — R15.2 FECAL URGENCY: Primary | ICD-10-CM

## 2023-06-08 ENCOUNTER — LAB (OUTPATIENT)
Dept: LAB | Facility: HOSPITAL | Age: 73
End: 2023-06-08
Payer: MEDICARE

## 2023-06-08 DIAGNOSIS — R80.9 PROTEINURIA, UNSPECIFIED TYPE: ICD-10-CM

## 2023-06-08 PROCEDURE — 84166 PROTEIN E-PHORESIS/URINE/CSF: CPT

## 2023-06-08 PROCEDURE — 84156 ASSAY OF PROTEIN URINE: CPT

## 2023-06-12 LAB
ALBUMIN MFR UR ELPH: 44.8 %
ALPHA1 GLOB MFR UR ELPH: 2.1 %
ALPHA2 GLOB MFR UR ELPH: 21.6 %
B-GLOBULIN MFR UR ELPH: 18.2 %
GAMMA GLOB MFR UR ELPH: 13.4 %
LABORATORY COMMENT REPORT: NORMAL
M PROTEIN MFR UR ELPH: NORMAL %
PROT UR-MCNC: 35.2 MG/DL

## 2023-12-18 ENCOUNTER — OFFICE VISIT (OUTPATIENT)
Dept: GASTROENTEROLOGY | Facility: CLINIC | Age: 73
End: 2023-12-18
Payer: MEDICARE

## 2023-12-18 VITALS
SYSTOLIC BLOOD PRESSURE: 160 MMHG | DIASTOLIC BLOOD PRESSURE: 105 MMHG | WEIGHT: 168 LBS | BODY MASS INDEX: 27 KG/M2 | HEART RATE: 88 BPM | HEIGHT: 66 IN

## 2023-12-18 DIAGNOSIS — R15.2 INCONTINENCE OF FECES WITH FECAL URGENCY: ICD-10-CM

## 2023-12-18 DIAGNOSIS — R19.7 DIARRHEA, UNSPECIFIED TYPE: Primary | ICD-10-CM

## 2023-12-18 DIAGNOSIS — Z86.010 HISTORY OF COLON POLYPS: ICD-10-CM

## 2023-12-18 DIAGNOSIS — R15.9 INCONTINENCE OF FECES WITH FECAL URGENCY: ICD-10-CM

## 2023-12-18 PROCEDURE — 3080F DIAST BP >= 90 MM HG: CPT | Performed by: NURSE PRACTITIONER

## 2023-12-18 PROCEDURE — 1160F RVW MEDS BY RX/DR IN RCRD: CPT | Performed by: NURSE PRACTITIONER

## 2023-12-18 PROCEDURE — 1159F MED LIST DOCD IN RCRD: CPT | Performed by: NURSE PRACTITIONER

## 2023-12-18 PROCEDURE — 3077F SYST BP >= 140 MM HG: CPT | Performed by: NURSE PRACTITIONER

## 2023-12-18 PROCEDURE — 99204 OFFICE O/P NEW MOD 45 MIN: CPT | Performed by: NURSE PRACTITIONER

## 2023-12-18 NOTE — PROGRESS NOTES
"Chief Complaint        Diarrhea (Has gone away)    History of Present Illness      Russell Hernandez is a 73 y.o. male who presents to [facility] as a new patient for diarrhea.    He has hx diarrhea for years. He had negative stool studies this past March with PCP. He was having fecal urgency and incontinence. Now better on probiotics and bentyl from PCP. He takes bentyl 2 tabs 4 times a day and at night. He admits no more accidents since starting those medicines.     Has had colon done this past May 2022 at [facility] with Dr. Childers-hx colon polyps. Recall 2027.  He does report previous EGD but I do not have access to those records at this time.    Has been vegetarian for 40 years. Hx CAD and stents 2. Denies any reflux or dysphagia. Good appetite. Denies any rectal bleeding or melena.     GI FH--- denies any    Results       Result Review :   The following data was reviewed by: YAYO Duque on 12/18/2023     CMP          3/21/2023    08:45   CMP   Glucose 101    BUN 21    Creatinine 0.92    EGFR 88.4    Sodium 137    Potassium 4.7    Chloride 101    Calcium 9.3    Total Protein 6.7    Albumin 4.3    Globulin 2.4    Total Bilirubin 0.6    Alkaline Phosphatase 77    AST (SGOT) 38    ALT (SGPT) 37    Albumin/Globulin Ratio 1.8    BUN/Creatinine Ratio 22.8    Anion Gap 12.1      CBC          3/21/2023    08:45   CBC   WBC 4.77    RBC 3.84    Hemoglobin 12.9    Hematocrit 38.3    MCV 99.7    MCH 33.6    MCHC 33.7    RDW 12.1    Platelets 189        TSH          3/21/2023    08:45   TSH   TSH 1.430        Lipase No results found for: \"LIPASE\"  Amylase No results found for: \"AMYLASE\"         Past Medical History       Past Medical History:   Diagnosis Date    Agitation     Alcoholism /alcohol abuse     Depression     Gout     HTN (hypertension)     Poor concentration     Sensitivity to light     Sleep apnea     Sleep disorder        Past Surgical History:   Procedure Laterality " "Date    JOINT REPLACEMENT Left     Knee    KNEE SURGERY           Current Outpatient Medications:     allopurinol (ZYLOPRIM) 300 MG tablet, Take 1 tablet by mouth Daily., Disp: 90 tablet, Rfl: 1    aspirin 81 MG chewable tablet, Chew 1 tablet Daily., Disp: , Rfl:     cholecalciferol (VITAMIN D3) 25 MCG (1000 UT) tablet, Take 1 tablet by mouth Daily., Disp: , Rfl:     dicyclomine (BENTYL) 10 MG capsule, Take 1 capsule by mouth 4 (Four) Times a Day Before Meals & at Bedtime. May take only as needed based on symptoms, Disp: 120 capsule, Rfl: 0    escitalopram (LEXAPRO) 5 MG tablet, Take 1 tablet by mouth Daily., Disp: 90 tablet, Rfl: 1    ibuprofen (ADVIL,MOTRIN) 200 MG tablet, Take 1 tablet by mouth Every 6 (Six) Hours As Needed for Mild Pain., Disp: , Rfl:     metoprolol succinate XL (Toprol XL) 50 MG 24 hr tablet, Take 1 tablet by mouth Daily., Disp: 90 tablet, Rfl: 1    Probiotic Product (PROBIOTIC PEARLS ADVANTAGE PO), Take  by mouth., Disp: , Rfl:     vitamin B-12 (CYANOCOBALAMIN) 100 MCG tablet, Take  by mouth Daily., Disp: , Rfl:      Allergies   Allergen Reactions    Atorvastatin Unknown - High Severity     Other reaction(s): Myalgia caused by statin    Statins Myalgia       Family History   Problem Relation Age of Onset    Kidney failure Mother     Diabetes Other     Heart disease Other     Hypertension Other         Social History     Social History Narrative    Not on file       Objective       Objective     Vital Signs:   BP (!) 160/105 (BP Location: Right arm, Patient Position: Sitting, Cuff Size: Large Adult)   Pulse 88   Ht 167.6 cm (66\")   Wt 76.2 kg (168 lb)   BMI 27.12 kg/m²     Body mass index is 27.12 kg/m².    Review of Systems   Constitutional:  Negative for appetite change, chills, diaphoresis, fatigue, fever and unexpected weight change.   HENT:  Negative for nosebleeds, postnasal drip, sore throat, trouble swallowing and voice change.    Respiratory:  Negative for cough, choking, chest " tightness, shortness of breath, wheezing and stridor.    Cardiovascular:  Negative for chest pain, palpitations and leg swelling.   Gastrointestinal:  Negative for abdominal distention, abdominal pain, anal bleeding, blood in stool, constipation, diarrhea, nausea, rectal pain and vomiting.   Endocrine: Negative for polydipsia, polyphagia and polyuria.   Musculoskeletal:  Negative for gait problem.   Skin:  Negative for rash and wound.   Allergic/Immunologic: Negative for food allergies.   Neurological:  Negative for dizziness, speech difficulty and light-headedness.   Psychiatric/Behavioral:  Negative for confusion, self-injury, sleep disturbance and suicidal ideas.         Physical Exam  Constitutional:       General: He is not in acute distress.     Appearance: He is well-developed. He is not ill-appearing.   HENT:      Head: Normocephalic.   Eyes:      Pupils: Pupils are equal, round, and reactive to light.   Cardiovascular:      Rate and Rhythm: Normal rate and regular rhythm.      Heart sounds: Normal heart sounds.   Pulmonary:      Effort: Pulmonary effort is normal.      Breath sounds: Normal breath sounds.   Abdominal:      General: Bowel sounds are normal. There is no distension.      Palpations: Abdomen is soft. There is no mass.      Tenderness: There is no abdominal tenderness. There is no guarding or rebound.      Hernia: No hernia is present.   Musculoskeletal:         General: Normal range of motion.   Skin:     General: Skin is warm and dry.   Neurological:      Mental Status: He is alert and oriented to person, place, and time.   Psychiatric:         Speech: Speech normal.         Behavior: Behavior normal.         Judgment: Judgment normal.              Assessment & Plan          Assessment and Plan    Diagnoses and all orders for this visit:    1. Diarrhea, unspecified type (Primary)    2. Incontinence of feces with fecal urgency    3. History of colon polyps    Reviewed medical history with him  today.  He is happy to report diarrhea with fecal incontinence and urgency is significantly better since taking daily probiotics and bentyl up to 4 times a day.  He denies any other GI complaints today.  Bowels moving well.  Good appetite.  Weight is stable.  He is up-to-date on his colonoscopy.  Next screening colonoscopy will be due in 2027 given his history of polyps.  Patient to call the office with any issues.  Patient to follow-up with me in 1 year.  Patient is agreeable to the plan.            Follow Up       Follow Up   Return in about 1 year (around 12/18/2024).  Patient was given instructions and counseling regarding his condition or for health maintenance advice. Please see specific information pulled into the AVS if appropriate.

## 2024-07-30 DIAGNOSIS — F41.1 GENERALIZED ANXIETY DISORDER: ICD-10-CM

## 2024-07-30 RX ORDER — ESCITALOPRAM OXALATE 5 MG/1
5 TABLET ORAL DAILY
Qty: 90 TABLET | Refills: 1 | OUTPATIENT
Start: 2024-07-30

## 2024-08-29 DIAGNOSIS — I71.40 ABDOMINAL AORTIC ANEURYSM (AAA) WITHOUT RUPTURE, UNSPECIFIED PART: ICD-10-CM

## 2024-08-29 DIAGNOSIS — I25.10 CORONARY ARTERY DISEASE INVOLVING NATIVE CORONARY ARTERY OF NATIVE HEART WITHOUT ANGINA PECTORIS: ICD-10-CM

## 2024-08-29 DIAGNOSIS — I10 ESSENTIAL HYPERTENSION: ICD-10-CM

## 2024-08-29 DIAGNOSIS — M1A.09X0 IDIOPATHIC CHRONIC GOUT OF MULTIPLE SITES WITHOUT TOPHUS: ICD-10-CM

## 2024-08-29 RX ORDER — ALLOPURINOL 300 MG/1
300 TABLET ORAL DAILY
Qty: 90 TABLET | Refills: 1 | OUTPATIENT
Start: 2024-08-29

## 2024-08-29 RX ORDER — METOPROLOL SUCCINATE 50 MG/1
50 TABLET, EXTENDED RELEASE ORAL DAILY
Qty: 90 TABLET | Refills: 1 | OUTPATIENT
Start: 2024-08-29

## 2024-09-03 DIAGNOSIS — I10 ESSENTIAL HYPERTENSION: ICD-10-CM

## 2024-09-03 DIAGNOSIS — I25.10 CORONARY ARTERY DISEASE INVOLVING NATIVE CORONARY ARTERY OF NATIVE HEART WITHOUT ANGINA PECTORIS: ICD-10-CM

## 2024-09-03 DIAGNOSIS — M1A.09X0 IDIOPATHIC CHRONIC GOUT OF MULTIPLE SITES WITHOUT TOPHUS: ICD-10-CM

## 2024-09-03 DIAGNOSIS — I71.40 ABDOMINAL AORTIC ANEURYSM (AAA) WITHOUT RUPTURE, UNSPECIFIED PART: ICD-10-CM

## 2024-09-03 RX ORDER — ALLOPURINOL 300 MG/1
300 TABLET ORAL DAILY
Qty: 90 TABLET | Refills: 1 | OUTPATIENT
Start: 2024-09-03

## 2024-09-03 RX ORDER — METOPROLOL SUCCINATE 50 MG/1
50 TABLET, EXTENDED RELEASE ORAL DAILY
Qty: 90 TABLET | Refills: 1 | OUTPATIENT
Start: 2024-09-03